# Patient Record
Sex: FEMALE | Race: WHITE | NOT HISPANIC OR LATINO | Employment: FULL TIME | URBAN - METROPOLITAN AREA
[De-identification: names, ages, dates, MRNs, and addresses within clinical notes are randomized per-mention and may not be internally consistent; named-entity substitution may affect disease eponyms.]

---

## 2020-11-06 ENCOUNTER — HOSPITAL ENCOUNTER (EMERGENCY)
Facility: MEDICAL CENTER | Age: 25
End: 2020-11-06
Attending: EMERGENCY MEDICINE
Payer: COMMERCIAL

## 2020-11-06 ENCOUNTER — APPOINTMENT (OUTPATIENT)
Dept: RADIOLOGY | Facility: MEDICAL CENTER | Age: 25
End: 2020-11-06
Attending: EMERGENCY MEDICINE
Payer: COMMERCIAL

## 2020-11-06 VITALS
SYSTOLIC BLOOD PRESSURE: 120 MMHG | HEART RATE: 63 BPM | DIASTOLIC BLOOD PRESSURE: 67 MMHG | BODY MASS INDEX: 23 KG/M2 | TEMPERATURE: 99.3 F | HEIGHT: 62 IN | OXYGEN SATURATION: 97 % | WEIGHT: 125 LBS | RESPIRATION RATE: 16 BRPM

## 2020-11-06 DIAGNOSIS — S89.92XA INJURY OF LEFT KNEE, INITIAL ENCOUNTER: ICD-10-CM

## 2020-11-06 PROCEDURE — 73564 X-RAY EXAM KNEE 4 OR MORE: CPT | Mod: LT

## 2020-11-06 PROCEDURE — 302093 ICE PACK LG: Mod: EDC | Performed by: EMERGENCY MEDICINE

## 2020-11-06 PROCEDURE — 99283 EMERGENCY DEPT VISIT LOW MDM: CPT | Mod: EDC

## 2020-11-06 RX ORDER — NAPROXEN 375 MG/1
375 TABLET ORAL 2 TIMES DAILY WITH MEALS
Qty: 60 TAB | Refills: 0 | Status: SHIPPED | OUTPATIENT
Start: 2020-11-06 | End: 2021-11-30

## 2020-11-06 ASSESSMENT — LIFESTYLE VARIABLES: DO YOU DRINK ALCOHOL: NO

## 2020-11-06 NOTE — LETTER
"  FORM C-4:  EMPLOYEE’S CLAIM FOR COMPENSATION/ REPORT OF INITIAL TREATMENT  EMPLOYEE’S CLAIM - PROVIDE ALL INFORMATION REQUESTED   First Name Tierra Last Name Neville Horn Birthdate 1995  Sex female Claim Number   Home Address [address]             [place] [address]                                   Age  25 y.o. 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 (if applicable) walking down stairs    How did this injury or occupational disease occur? Be specific and answer in detail. Use additional sheet if necessary)  Walking down stairs, rolled ankle, knee gave out.    If you believe that you have an occupational disease, when did you first have knowledge of the disability and it relationship to your employment? na Witnesses to the Accident  Carlos Andrea   Nature of Injury or Occupational Disease  Workers' Compensation Part(s) of Body Injured or Affected  Knee (L), N/A, N/A    I CERTIFY THAT THE ABOVE IS TRUE AND CORRECT TO THE BEST OF MY KNOWLEDGE AND THAT I HAVE PROVIDED THIS INFORMATION IN ORDER TO OBTAIN THE BENEFITS OF [place] " [place]).  I HEREBY AUTHORIZE ANY PHYSICIAN, CHIROPRACTOR, SURGEON, PRACTITIONER, OR OTHER PERSON, ANY HOSPITAL, INCLUDING [facility] OR [facility] H[facility], ANY MEDICAL SERVICE ORGANIZATION, ANY INSURANCE COMPANY, OR OTHER INSTITUTION OR ORGANIZATION TO RELEASE TO EACH OTHER, ANY MEDICAL OR OTHER INFORMATION, INCLUDING BENEFITS PAID OR PAYABLE, PERTINENT TO THIS INJURY OR DISEASE, EXCEPT INFORMATION RELATIVE TO DIAGNOSIS, TREATMENT AND/OR COUNSELING FOR AIDS, PSYCHOLOGICAL CONDITIONS, ALCOHOL OR CONTROLLED SUBSTANCES, FOR WHICH I MUST GIVE SPECIFIC AUTHORIZATION.  A PHOTOSTAT OF THIS AUTHORIZATION SHALL BE AS VALID AS THE ORIGINAL.  Date   11/06/2020      Place    [facility]               Employee’s Signature   THIS REPORT MUST BE COMPLETED AND MAILED WITHIN 3 WORKING DAYS OF TREATMENT   Place [facility], EMERGENCY DEPT                       Name of Facility [facility]   Date  11/6/2020 Diagnosis  (S89.92XA) Injury of left knee, initial encounter Is there evidence the injured employee was under the influence of alcohol and/or another controlled substance at the time of accident?   Hour  2:14 PM Description of Injury or Disease  Injury of left knee, initial encounter No   Treatment  Evaluation of the injury with x-rays.  The patient will be treated with ice and anti-inflammatories.  The patient is to follow-up with orthopedics/occupational health for further outpatient treatment and care.  Have you advised the patient to remain off work five days or more?         No   X-Ray Findings  Negative If Yes   From Date    To Date      From information given by the employee, together with medical evidence, can you directly connect this injury or occupational disease as job incurred? Yes If No, is employee capable of: Full Duty  No Modified Duty  Yes   Is additional medical care by a physician indicated? Yes If Modified Duty, Specify any  "Limitations / Restrictions   Knee immobilizer and crutches until cleared by occupational health or orthopedic surgery.   Do you know of any previous injury or disease contributing to this condition or occupational disease? No    Date 11/6/2020 Print Doctor’s Name Erwin Garcia certify the employer’s copy of this form was mailed on:   Address [address] [place] [address]  [place] [id number] [phone] INSURER’S USE ONLY   Provider’s Tax ID Number [id number] Telephone Dept: [phone]    Doctor’s Signature e-ERWIN Marcano M.D. Degree  M.D.      Form C-4 (rev.10/07)                                                                         BRIEF DESCRIPTION OF RIGHTS AND BENEFITS  (Pursuant to [place] 616C.050)    Notice of Injury or Occupational Disease (Incident Report Form C-1): If an injury or occupational disease (OD) arises out of and in the course of employment, you must provide written notice to your employer as soon as practicable, but no later than 7 days after the accident or OD. Your employer shall maintain a sufficient supply of the required forms.    Claim for Compensation (Form C-4): If medical treatment is sought, the form C-4 is available at the place of initial treatment. A completed \"Claim for Compensation\" (Form C-4) must be filed within 90 days after an accident or OD. The treating physician or chiropractor must, within 3 working days after treatment, complete and mail to the employer, the employer's insurer and third-party , the Claim for Compensation.    Medical Treatment: If you require medical treatment for your on-the-job injury or OD, you may be required to select a physician or chiropractor from a list provided by your workers’ compensation insurer, if it has contracted with an Organization for Managed Care (MCO) or Preferred Provider Organization (PPO) or providers of health care. If your employer has not entered into a contract with an MCO or PPO, you may select a physician or " chiropractor from the Panel of Physicians and Chiropractors. Any medical costs related to your industrial injury or OD will be paid by your insurer.    Temporary Total Disability (TTD): If your doctor has certified that you are unable to work for a period of at least 5 consecutive days, or 5 cumulative days in a 20-day period, or places restrictions on you that your employer does not accommodate, you may be entitled to TTD compensation.    Temporary Partial Disability (TPD): If the wage you receive upon reemployment is less than the compensation for TTD to which you are entitled, the insurer may be required to pay you TPD compensation to make up the difference. TPD can only be paid for a maximum of 24 months.    Permanent Partial Disability (PPD): When your medical condition is stable and there is an indication of a PPD as a result of your injury or OD, within 30 days, your insurer must arrange for an evaluation by a rating physician or chiropractor to determine the degree of your PPD. The amount of your PPD award depends on the date of injury, the results of the PPD evaluation and your age and wage.    Permanent Total Disability (PTD): If you are medically certified by a treating physician or chiropractor as permanently and totally disabled and have been granted a PTD status by your insurer, you are entitled to receive monthly benefits not to exceed 66 2/3% of your average monthly wage. The amount of your PTD payments is subject to reduction if you previously received a PPD award.    Vocational Rehabilitation Services: You may be eligible for vocational rehabilitation services if you are unable to return to the job due to a permanent physical impairment or permanent restrictions as a result of your injury or occupational disease.    Transportation and Per Jose Reimbursement: You may be eligible for travel expenses and per jose associated with medical treatment.    Reopening: You may be able to reopen your claim if  your condition worsens after claim closure.     Appeal Process: If you disagree with a written determination issued by the insurer or the insurer does not respond to your request, you may appeal to the Department of Administration, , by following the instructions contained in your determination letter. You must appeal the determination within 70 days from the date of the determination letter at [address], [address], [place], [place] [address], or [address], [place], [place] [address]. If you disagree with the  decision, you may appeal to the Department of Administration, . You must file your appeal within 30 days from the date of the  decision letter at [address], [address], [place], [place] [address], or [address], [facility] [address], [place], [place] [address]. If you disagree with a decision of an , you may file a petition for judicial review with the District Court. You must do so within 30 days of the Appeal Officer’s decision. You may be represented by an  at your own expense or you may contact the [facility] for possible representation.    [place]  for Injured Workers (NAIW): If you disagree with a  decision, you may request that NAIW represent you without charge at an  Hearing. For information regarding denial of benefits, you may contact the [facility] at: [address], [address], [place] [address], [phone], or [address], [facility] [address] [facility] [address], [place] [address], [phone]    To File a Complaint with the Division: If you wish to file a complaint with the  of the Division of Industrial Relations (DIR),  please contact the Workers’ Compensation Section, [address], [address], [place], [place] [address], telephone [phone], or [address], [place], [place] [address], telephone [phone]  [phone].    For assistance with Workers’ Compensation Issues: You may contact the Office of the Governor Consumer Health Assistance, [address] [place] [address], [address], [place] [address], Toll Free 1-531.360.8902, Web site: http://govTrinity Health System [email or web address]., E-mail [email or web address].  D-2 (rev. 06/18)              __________________________________________________________________                                    _________________            Employee Name / Signature                                                                                                                            Date

## 2020-11-06 NOTE — ED PROVIDER NOTES
"ED Provider Note    Scribed for Erwin Garcia M.D. by Carlos Browning. 11/6/2020, 11:39 AM.    Primary care provider: No primary care provider on file.  Means of arrival: Walk in  History obtained from: Patient  History limited by: None    CHIEF COMPLAINT  Chief Complaint   Patient presents with   • T-5000 Extremity Pain     lt knee       HPI  Tierra Horn is a 25 y.o. female who presents to the Emergency Department for left knee pain onset prior to arrival. Patient states that she was walking down the steps at work when her left knee gave out and she fell down. The pain is described as burning behind her knee. She landed on her sided and denies any pain at this time. Patient notes that she hurt her left knee previously, but did no follow up with orthopedics.The pain is exacerbated when she attempted to straighten her leg. No alleviating factors were stated.     REVIEW OF SYSTEMS  As above otherwise all other systems are negative    PAST MEDICAL HISTORY  None stated.    SURGICAL HISTORY  patient denies any surgical history    SOCIAL HISTORY  Social History     Tobacco Use   • Smoking status: Never Smoker   • Smokeless tobacco: Never Used   Substance Use Topics   • Alcohol use: Not Currently   • Drug use: Not Currently      Social History     Substance and Sexual Activity   Drug Use Not Currently       FAMILY HISTORY  History reviewed. No pertinent family history.    CURRENT MEDICATIONS  No current facility-administered medications for this encounter.     Current Outpatient Medications:   •  naproxen, 375 mg, Oral, BID WITH MEALS     ALLERGIES  No Known Allergies    PHYSICAL EXAM  VITAL SIGNS: /103   Pulse (!) 57   Temp 36.4 °C (97.6 °F) (Temporal)   Resp 18   Ht 1.575 m (5' 2\")   Wt 56.7 kg (125 lb)   SpO2 99%   BMI 22.86 kg/m²     Constitutional: Well developed, Well nourished, No acute distress, Non-toxic appearance.   HENT: Normocephalic, Atraumatic,   Neck: non tender  Cardiovascular: " Regular rate and rhythm without murmurs gallops or rubs.   Thorax & Lungs: No respiratory distress. Breathing comfortably. Lungs are clear to auscultation bilaterally, there are no wheezes no rales. Chest wall is nontender.  Abdomen: Soft, nontender, nondistended. Bowel sounds are present.   Back: No tenderness,.  Musculoskeletal: Limited extension to approximately 10° and limit flection to approximately 100°. ACL, MCL, LCL, and PCL with no obvious deformity. No effusion. Tenderness to medial joint space. No major deformities noted. Intact distal pulses, no clubbing, no cyanosis, no edema,   Neurologic: Alert & oriented x 3, Moving all extremities. No gross abnormalities.    Psychiatric: Affect normal, Judgment normal, Mood normal.     RADIOLOGY  DX-KNEE COMPLETE 4+ LEFT   Final Result      No acute osseous abnormality.        The radiologist's interpretation of all radiological studies have been reviewed by me.    COURSE & MEDICAL DECISION MAKING  Pertinent Labs & Imaging studies reviewed. (See chart for details)    11:39 AM - Patient seen and examined at bedside. Ordered dx-knee to evaluate her symptoms.  1:59 PM Patient was reevaluated at bedside. Discussed radiology results with the patient as detailed above. Patient states that she is feeling better and was able to stand on her knee. Patient will be discharge at this time and will follow up with orthopedics.    Decision Making:  Patient presents emerge department for evaluation for clinically patient's ACL, PCL, LCL and MCL are grossly intact the patient just has discomfort with asked extension of the knee itself.  3 possible this could be a meniscal injury versus subchondral issue.  At this point we will place the patient into a knee immobilizer splint crutches recommend for the patient to follow-up with orthopedic surgery for further outpatient treatment and care however this did happen on the job so I recommended that she follow-up with occupational health  for this referral.     The patient will return for new or worsening symptoms and is stable at the time of discharge.    The patient is referred to a primary physician for blood pressure management, diabetic screening, and for all other preventative health concerns.    DISPOSITION:  Patient will be discharged home in stable condition.    FOLLOW UP:  [facility] [employer] [place] [address] [place] [address] [phone]  Schedule an appointment as soon as possible for a visit   For re-check, Return if any symptoms worsen      OUTPATIENT MEDICATIONS:  Discharge Medication List as of 11/6/2020  2:30 PM      START taking these medications    Details   naproxen (NAPROSYN) 375 MG Tab Take 1 Tab by mouth 2 times a day, with meals., Disp-60 Tab, R-0, Print Rx Paper               FINAL IMPRESSION  1. Injury of left knee, initial encounter          Carlos WRIGHT (Scribe), am scribing for, and in the presence of, Erwin Garcia M.D..    Electronically signed by: Carlos Browning ([place]), 11/6/2020    IErwin M.D. personally performed the services described in this documentation, as scribed by Carlos Browning in my presence, and it is both accurate and complete.  E  The note accurately reflects work and decisions made by me.  Erwin Garcia M.D.  11/6/2020  3:11 PM

## 2020-11-06 NOTE — ED NOTES
Tierra Horn has been discharged from the Emergency Room.    Discharge instructions, which include signs and symptoms to monitor patient for, hydration and hand hygiene importance, as well as detailed information regarding knee sprain provided.  This RN also encouraged a follow- up appointment to be made with patient's PCP.  All questions and concerns addressed at this time.        Prescription for naproxen with patient. Patient instructed on importance of completing full course of medication, verbalized understanding.    Patient leaves ER in no apparent distress, is awake, alert, pink. Pt is aware of the need to return to the ER for any concerns or changes in current condition.

## 2020-11-06 NOTE — ED NOTES
Agree with triage note.  Pt reporting left medal knee pain.  Per pt she injured her knee twice and was told she had a MCL injury.  Today, pt knee gave out and increase in pain with leg extension.  Negative posterior and anterior drawer test.  Pt denies any numbness or tingling.  Pt states increase in tenderness with palpation.  Pt denies taking any medication to help with the pain.  Chart up for ERP.

## 2020-11-06 NOTE — ED TRIAGE NOTES
"Pt to triage, c/o lt knee pain, states her knee gave out on her while working, went to take a step and it was off causing her knee to give out\" .   "

## 2020-11-06 NOTE — LETTER
"  FORM C-4:  EMPLOYEE’S CLAIM FOR COMPENSATION/ REPORT OF INITIAL TREATMENT  EMPLOYEE’S CLAIM - PROVIDE ALL INFORMATION REQUESTED   First Name Tierra Last Name Neville Horn Birthdate 1995  Sex female Claim Number   Home Address [address]             Zip [address]                                   Age  25 y.o. 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 (if applicable) walking down stairs    How did this injury or occupational disease occur? Be specific and answer in detail. Use additional sheet if necessary)  Walking down stairs, rolled ankle, knee gave out.    If you believe that you have an occupational disease, when did you first have knowledge of the disability and it relationship to your employment? na Witnesses to the Accident  Carlos Andrea   Nature of Injury or Occupational Disease  Workers' Compensation Part(s) of Body Injured or Affected  Knee (L), N/A, N/A    I CERTIFY THAT THE ABOVE IS TRUE AND CORRECT TO THE BEST OF MY KNOWLEDGE AND THAT I HAVE PROVIDED THIS INFORMATION IN ORDER TO OBTAIN THE BENEFITS OF [place] " [place]).  I HEREBY AUTHORIZE ANY PHYSICIAN, CHIROPRACTOR, SURGEON, PRACTITIONER, OR OTHER PERSON, ANY HOSPITAL, INCLUDING [facility] OR [facility] H[facility]PITAL, ANY MEDICAL SERVICE ORGANIZATION, ANY INSURANCE COMPANY, OR OTHER INSTITUTION OR ORGANIZATION TO RELEASE TO EACH OTHER, ANY MEDICAL OR OTHER INFORMATION, INCLUDING BENEFITS PAID OR PAYABLE, PERTINENT TO THIS INJURY OR DISEASE, EXCEPT INFORMATION RELATIVE TO DIAGNOSIS, TREATMENT AND/OR COUNSELING FOR AIDS, PSYCHOLOGICAL CONDITIONS, ALCOHOL OR CONTROLLED SUBSTANCES, FOR WHICH I MUST GIVE SPECIFIC AUTHORIZATION.  A PHOTOSTAT OF THIS AUTHORIZATION SHALL BE AS VALID AS THE ORIGINAL.  Date                                      Place                                                                             Employee’s Signature   THIS REPORT MUST BE COMPLETED AND MAILED WITHIN 3 WORKING DAYS OF TREATMENT   Place [facility], EMERGENCY DEPT                       Name of Facility [facility]   Date  11/6/2020 Diagnosis  (S89.92XA) Injury of left knee, initial encounter Is there evidence the injured employee was under the influence of alcohol and/or another controlled substance at the time of accident?   Hour  2:28 PM Description of Injury or Disease  Injury of left knee, initial encounter No   Treatment  Evaluation of the injury with x-rays.  The patient will be treated with ice and anti-inflammatories.  The patient is to follow-up with orthopedics/occupational health for further outpatient treatment and care.  Have you advised the patient to remain off work five days or more?         No   X-Ray Findings  Negative If Yes   From Date    To Date      From information given by the employee, together with medical evidence, can you directly connect this injury or occupational disease as job incurred? Yes If No, is employee capable of: Full Duty  No Modified Duty  Yes   Is additional medical care by a physician  "indicated? Yes If Modified Duty, Specify any Limitations / Restrictions   Knee immobilizer and crutches until cleared by occupational health or orthopedic surgery.   Do you know of any previous injury or disease contributing to this condition or occupational disease? No    Date 11/6/2020 Print Doctor’s Name Erwin Garcia certify the employer’s copy of this form was mailed on:   Address [address] [place] [id number] [phone] INSURER’S USE ONLY   Provider’s Tax ID Number [id number] Telephone Dept: [phone]    Doctor’s Signature e-ERWIN Marcano M.D. Degree  M.D.      Form C-4 (rev.10/07)                                                                         BRIEF DESCRIPTION OF RIGHTS AND BENEFITS  (Pursuant to [place] 616C.050)    Notice of Injury or Occupational Disease (Incident Report Form C-1): If an injury or occupational disease (OD) arises out of and in the course of employment, you must provide written notice to your employer as soon as practicable, but no later than 7 days after the accident or OD. Your employer shall maintain a sufficient supply of the required forms.    Claim for Compensation (Form C-4): If medical treatment is sought, the form C-4 is available at the place of initial treatment. A completed \"Claim for Compensation\" (Form C-4) must be filed within 90 days after an accident or OD. The treating physician or chiropractor must, within 3 working days after treatment, complete and mail to the employer, the employer's insurer and third-party , the Claim for Compensation.    Medical Treatment: If you require medical treatment for your on-the-job injury or OD, you may be required to select a physician or chiropractor from a list provided by your workers’ compensation insurer, if it has contracted with an Organization for Managed Care (MCO) or Preferred Provider Organization (PPO) or providers of health care. If your employer has not entered into a contract with " an MCO or PPO, you may select a physician or chiropractor from the Panel of Physicians and Chiropractors. Any medical costs related to your industrial injury or OD will be paid by your insurer.    Temporary Total Disability (TTD): If your doctor has certified that you are unable to work for a period of at least 5 consecutive days, or 5 cumulative days in a 20-day period, or places restrictions on you that your employer does not accommodate, you may be entitled to TTD compensation.    Temporary Partial Disability (TPD): If the wage you receive upon reemployment is less than the compensation for TTD to which you are entitled, the insurer may be required to pay you TPD compensation to make up the difference. TPD can only be paid for a maximum of 24 months.    Permanent Partial Disability (PPD): When your medical condition is stable and there is an indication of a PPD as a result of your injury or OD, within 30 days, your insurer must arrange for an evaluation by a rating physician or chiropractor to determine the degree of your PPD. The amount of your PPD award depends on the date of injury, the results of the PPD evaluation and your age and wage.    Permanent Total Disability (PTD): If you are medically certified by a treating physician or chiropractor as permanently and totally disabled and have been granted a PTD status by your insurer, you are entitled to receive monthly benefits not to exceed 66 2/3% of your average monthly wage. The amount of your PTD payments is subject to reduction if you previously received a PPD award.    Vocational Rehabilitation Services: You may be eligible for vocational rehabilitation services if you are unable to return to the job due to a permanent physical impairment or permanent restrictions as a result of your injury or occupational disease.    Transportation and Per Jose Reimbursement: You may be eligible for travel expenses and per jose associated with medical  treatment.    Reopening: You may be able to reopen your claim if your condition worsens after claim closure.     Appeal Process: If you disagree with a written determination issued by the insurer or the insurer does not respond to your request, you may appeal to the Department of Administration, , by following the instructions contained in your determination letter. You must appeal the determination within 70 days from the date of the determination letter at [address], [address], [place], [place] [address], or [address], [place], [place] [address]. If you disagree with the  decision, you may appeal to the Department of Administration, . You must file your appeal within 30 days from the date of the  decision letter at [address], [address], [place], [place] [address], or [address], [place], [place] [address]. If you disagree with a decision of an , you may file a petition for judicial review with the District Court. You must do so within 30 days of the Appeal Officer’s decision. You may be represented by an  at your own expense or you may contact the [facility] for possible representation.    [place]  for Injured Workers (NAIW): If you disagree with a  decision, you may request that NAIW represent you without charge at an  Hearing. For information regarding denial of benefits, you may contact the [facility] at: [address], [address], [place] [address], [phone], or [address], [place] [address], [phone]    To File a Complaint with the Division: If you wish to file a complaint with the  of the Division of Industrial Relations (DIR),  please contact the Workers’ Compensation Section, [address], [address], [place], [place] [address], telephone [phone]) [phone], or [address], [place], [place] [address], telephone [phone].    For assistance with Workers’ Compensation Issues: You may contact the Office of the Governor Consumer Health Assistance, [facility] [address], [place], [place] [address], Toll Free 1-138.929.1087, Web site: [email or web address]., E-mail [email or web address].  D-2 (rev. 06/18)              __________________________________________________________________                                    _________________            Employee Name / Signature                                                                                                                            Date

## 2021-11-30 ENCOUNTER — OFFICE VISIT (OUTPATIENT)
Dept: MEDICAL GROUP | Facility: MEDICAL CENTER | Age: 26
End: 2021-11-30
Payer: COMMERCIAL

## 2021-11-30 VITALS
BODY MASS INDEX: 25.27 KG/M2 | DIASTOLIC BLOOD PRESSURE: 62 MMHG | OXYGEN SATURATION: 98 % | WEIGHT: 137.35 LBS | TEMPERATURE: 98.1 F | HEIGHT: 62 IN | HEART RATE: 76 BPM | SYSTOLIC BLOOD PRESSURE: 108 MMHG | RESPIRATION RATE: 16 BRPM

## 2021-11-30 DIAGNOSIS — Z23 NEED FOR VACCINATION: ICD-10-CM

## 2021-11-30 DIAGNOSIS — F60.3 BORDERLINE PERSONALITY DISORDER (HCC): ICD-10-CM

## 2021-11-30 DIAGNOSIS — Z30.09 BIRTH CONTROL COUNSELING: ICD-10-CM

## 2021-11-30 DIAGNOSIS — F41.1 GAD (GENERALIZED ANXIETY DISORDER): ICD-10-CM

## 2021-11-30 DIAGNOSIS — F33.1 MODERATE EPISODE OF RECURRENT MAJOR DEPRESSIVE DISORDER (HCC): ICD-10-CM

## 2021-11-30 DIAGNOSIS — Z97.5 IUD (INTRAUTERINE DEVICE) IN PLACE: ICD-10-CM

## 2021-11-30 DIAGNOSIS — G47.9 SLEEP DISORDER: ICD-10-CM

## 2021-11-30 DIAGNOSIS — Z00.00 PREVENTATIVE HEALTH CARE: ICD-10-CM

## 2021-11-30 PROBLEM — F41.0 PANIC ATTACK: Status: ACTIVE | Noted: 2020-08-19

## 2021-11-30 PROBLEM — F43.22 ADJUSTMENT DISORDER WITH ANXIOUS MOOD: Status: ACTIVE | Noted: 2020-08-19

## 2021-11-30 PROBLEM — F32.1 CURRENT MODERATE EPISODE OF MAJOR DEPRESSIVE DISORDER (HCC): Status: ACTIVE | Noted: 2021-11-30

## 2021-11-30 PROBLEM — A56.2 CHLAMYDIA TRACHOMATIS INFECTION OF GENITOURINARY SITE: Status: ACTIVE | Noted: 2019-07-05

## 2021-11-30 PROCEDURE — 99204 OFFICE O/P NEW MOD 45 MIN: CPT | Mod: 25 | Performed by: PHYSICIAN ASSISTANT

## 2021-11-30 PROCEDURE — 90471 IMMUNIZATION ADMIN: CPT | Performed by: PHYSICIAN ASSISTANT

## 2021-11-30 PROCEDURE — 90651 9VHPV VACCINE 2/3 DOSE IM: CPT | Performed by: PHYSICIAN ASSISTANT

## 2021-11-30 RX ORDER — COPPER 313.4 MG/1
INTRAUTERINE DEVICE INTRAUTERINE
COMMUNITY
Start: 2016-12-14 | End: 2022-07-14

## 2021-11-30 RX ORDER — NORGESTREL AND ETHINYL ESTRADIOL 0.3-0.03MG
1 KIT ORAL DAILY
Qty: 84 TABLET | Refills: 3 | Status: SHIPPED | OUTPATIENT
Start: 2021-11-30 | End: 2023-03-23 | Stop reason: SINTOL

## 2021-11-30 RX ORDER — HYDROXYZINE HYDROCHLORIDE 25 MG/1
12.5-25 TABLET, FILM COATED ORAL 3 TIMES DAILY PRN
Qty: 30 TABLET | Refills: 1 | Status: SHIPPED | OUTPATIENT
Start: 2021-11-30 | End: 2023-03-23

## 2021-11-30 ASSESSMENT — ANXIETY QUESTIONNAIRES
4. TROUBLE RELAXING: NEARLY EVERY DAY
6. BECOMING EASILY ANNOYED OR IRRITABLE: NEARLY EVERY DAY
IF YOU CHECKED OFF ANY PROBLEMS ON THIS QUESTIONNAIRE, HOW DIFFICULT HAVE THESE PROBLEMS MADE IT FOR YOU TO DO YOUR WORK, TAKE CARE OF THINGS AT HOME, OR GET ALONG WITH OTHER PEOPLE: SOMEWHAT DIFFICULT
2. NOT BEING ABLE TO STOP OR CONTROL WORRYING: NEARLY EVERY DAY
GAD7 TOTAL SCORE: 21
5. BEING SO RESTLESS THAT IT IS HARD TO SIT STILL: NEARLY EVERY DAY
3. WORRYING TOO MUCH ABOUT DIFFERENT THINGS: NEARLY EVERY DAY
1. FEELING NERVOUS, ANXIOUS, OR ON EDGE: NEARLY EVERY DAY
7. FEELING AFRAID AS IF SOMETHING AWFUL MIGHT HAPPEN: NEARLY EVERY DAY

## 2021-11-30 ASSESSMENT — PATIENT HEALTH QUESTIONNAIRE - PHQ9
SUM OF ALL RESPONSES TO PHQ QUESTIONS 1-9: 18
8. MOVING OR SPEAKING SO SLOWLY THAT OTHER PEOPLE COULD HAVE NOTICED. OR THE OPPOSITE, BEING SO FIGETY OR RESTLESS THAT YOU HAVE BEEN MOVING AROUND A LOT MORE THAN USUAL: NEARLY EVERY DAY
4. FEELING TIRED OR HAVING LITTLE ENERGY: MORE THAN HALF THE DAYS
5. POOR APPETITE OR OVEREATING: SEVERAL DAYS
3. TROUBLE FALLING OR STAYING ASLEEP OR SLEEPING TOO MUCH: SEVERAL DAYS
7. TROUBLE CONCENTRATING ON THINGS, SUCH AS READING THE NEWSPAPER OR WATCHING TELEVISION: MORE THAN HALF THE DAYS
SUM OF ALL RESPONSES TO PHQ9 QUESTIONS 1 AND 2: 6
1. LITTLE INTEREST OR PLEASURE IN DOING THINGS: NEARLY EVERY DAY
9. THOUGHTS THAT YOU WOULD BE BETTER OFF DEAD, OR OF HURTING YOURSELF: NOT AT ALL
6. FEELING BAD ABOUT YOURSELF - OR THAT YOU ARE A FAILURE OR HAVE LET YOURSELF OR YOUR FAMILY DOWN: NEARLY EVERY DAY
2. FEELING DOWN, DEPRESSED, IRRITABLE, OR HOPELESS: NEARLY EVERY DAY

## 2021-11-30 NOTE — PROGRESS NOTES
"Chief Complaint   Patient presents with   • Establish Care   • Contraception     discuss options    • Referral Needed     therapy       HPI  Tierra Horn is a 26 y.o. female here today for establishing care.  Moved here from [place]    HPI:  Patient is G1, P1, has a 7-year-old daughter who lives with her ex. patient has history of depression and anxiety, has tried Prozac and Celexa in the past however has a stopped because she had some SE, states she was told she probably has borderline personality disorder however due to loss of insurance she was never able to follow-up.  Currently has daily anxiety, difficulty with sleep,  Patient has been using daily marijuana to help self medicate for sleep and anxiety.     Patient has copper IUD, was put in about 4-1/2 years ago, was doing okay at the beginning, started having pain with menses and currently has pain daily with cramps.  States pain can interfere with her job.  Takes OTC NSAIDs for pain.  Periods are regular with normal flow.    Exam:  /62 (BP Location: Left arm, Patient Position: Sitting)   Pulse 76   Temp 36.7 °C (98.1 °F) (Temporal)   Resp 16   Ht 1.575 m (5' 2\")   Wt 62.3 kg (137 lb 5.6 oz)   SpO2 98%       Constitutional: Alert, oriented in no acute distress.  Psych: Eye contact is good, speech goal directed, affect calm  Eyes: Conjunctiva non-injected, sclera non-icteric.  Lungs: Unlabored respiratory effort, clear to auscultation bilaterally with good excursion, no wheez or rhonci  CV: regular rate and rhythm. No lower extremity edema  Ears:  External ears unremarkable. TMs pearly gray, clear and intact, w/o any perforation or effusion.        A/P:    1. Need for vaccination    - 9VHPV Vaccine 2-3 Dose (GARDASIL 9)    2. Moderate episode of recurrent major depressive disorder (HCC)    - Referral to Behavioral Health    3. EVA (generalized anxiety disorder)    - Referral to Behavioral Health    4. Preventative health care    - CBC WITH " DIFFERENTIAL; Future  - Comp Metabolic Panel; Future  - Lipid Profile; Future  - TSH WITH REFLEX TO FT4; Future  - VITAMIN D,25 HYDROXY; Future    5. IUD (intrauterine device) in place    6. Birth control counseling    - norgestrel-ethinyl estradiol (LOW-OGESTREL) 0.3-30 MG-MCG Tab; Take 1 Tablet by mouth every day.  Dispense: 84 Tablet; Refill: 3    7. Borderline personality disorder (HCC)  - Referral to Behavioral Health    8. Sleep disorder    - hydrOXYzine HCl (ATARAX) 25 MG Tab; Take 0.5-1 Tablets by mouth 3 times a day as needed for Anxiety.  Dispense: 30 Tablet; Refill: 1        F/U: 4 wks for pap, IUD removal, lab f/u

## 2021-11-30 NOTE — LETTER
[facility]  [id number]     November 30, 2021    Patient: Tierra Horn   YOB: 1995   Date of Visit: 11/30/2021       To Whom It May Concern:    Tierra Horn was seen and treated in our department on 11/30/2021.     Sincerely,         Betsy Mahoney P.A.-C.

## 2021-11-30 NOTE — LETTER
November 30, 2021         Patient: Tierra Horn   YOB: 1995   Date of Visit: 11/30/2021           To Whom it May Concern:    Tierra Horn was seen in my clinic on 11/30/2021. Please excuse her absence today.    If you have any questions or concerns, please don't hesitate to call.        Sincerely,           Betsy Mahoney P.A.-C.  Electronically Signed

## 2021-12-20 ENCOUNTER — TELEPHONE (OUTPATIENT)
Dept: MEDICAL GROUP | Facility: MEDICAL CENTER | Age: 26
End: 2021-12-20

## 2021-12-20 NOTE — TELEPHONE ENCOUNTER
"This patient had an appoint scheduled on 12/15/2021 which she had arrived about 17 minutes late. I explained to the patient that at this point we will need to reschedule her visit since its passed the 10min late policy and due to the reason there is not enough to do her visit. At this moment  Patient got very upset and asked to be seen either way, which I did approach Betsy and even she said she cant do what she is asking but will still talk to her if she has concerns but will not do her pap or remove the IUD.  I explained to the patient her options which she was not happy about. She insisted to be seen because she has taken time of and mentioned how PCP only cares about going to lunch and not her patients. She inquired on changing PCP and I Gave her options on other locations which will be closer to her if needed. Patient was still very rude and combative about the policy and how we \"do not care\" at this point the only options were reschedule to another day and time or she can seek a new provider at a location closest to her. Wee rescheduled and she left cursing and mumbling under her breath which there were other patients in the lobby witnessing what was happening.   "

## 2022-02-10 ENCOUNTER — TELEMEDICINE (OUTPATIENT)
Dept: BEHAVIORAL HEALTH | Facility: CLINIC | Age: 27
End: 2022-02-10
Payer: COMMERCIAL

## 2022-02-10 DIAGNOSIS — F43.23 ADJUSTMENT DISORDER WITH MIXED ANXIETY AND DEPRESSED MOOD: ICD-10-CM

## 2022-02-10 PROCEDURE — 90791 PSYCH DIAGNOSTIC EVALUATION: CPT | Performed by: PSYCHOLOGIST

## 2022-02-10 NOTE — PROGRESS NOTES
BEHAVIORAL HEALTH  INITIAL PSYCHOLOGICAL EVALUATION    Name: Tierra Horn   MRN: [id number]   : 1995   Age: 26 y.o.   Date of assessment: 2/10/2022   PCP: Betsy Mahoney P.A.-C.   Persons in attendance:Patient  Total face-to-face time: 57    This evaluation was conducted via Zoom using secure and encrypted videoconferencing technology. The patient was at home (POS 10) in the [facility].  The patient's identity was confirmed and verbal consent was obtained for this virtual visit.     This provider informed the patient that their medical records are completely confidential except for the use by other providers involved in their care, or if the patient signs a Release of Information, or to report instances of child or elder abuse, or if it is determined they are an immediate risk of harm to themselves or others.    CHIEF COMPLAINT AND HISTORY OF PRESENTING PROBLEM:   (As stated by Patient)  Tierra  is a 26 y.o., female referred for assessment by self. Primary presenting issue includes No chief complaint on file.    The year has been hectic, and now she has insurance. The previous therapist mentioned both depression and borderline PD. She also has general health concerns and relationship issues.     She lives with her boyfriend and would like to better her communication skills. They have been together for about 4 years, having gone to high school together. She was with her daughter's dad for 4 years, and she is 7 years old.     She does not have a car as it broke down a year and a half ago.     Her boyfriend had a construction job and she worked for [employer] allowing her to have 4 consecutive days off. Eventually she left [employer], and her boyfriend also quit. They lived with his mom to save, and moved out to [place] again with his dad around the time that COVID started. 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     There are financial stressors.    She  "hopes to change her primary care physician, and is having trouble with her IUD causing pain. They lost a day of pay, and did not get the problem resolved. She started taking birth control, but is now pregnant. She got COVID in January and was in the hospital, where they found an ovarian cyst. She planned to terminate the pregnancy but felt guilty and cancelled. They continue to have stress. Her boyfriend has been \"bossy about my pregnancy.\" Her promise to herself was to have the same last name as her baby. She also told him that to stay together she would want to be together out of love and not obligation. He told her last night that he does not want her to have his last name. She has a fear of him leaving her. He sometimes says he does not want the baby.     Her boyfriend feels too young to be a father, and she thinks he may not be emotionally mature enough. He says things insensitively: \"I don't want to be a dad, I never wanted to be.\" She feels like anytime she wants to talk, he experiences her as bossy and nagging.    She is confused. He says he does not want to be a dad, but then will say he wants the baby to be a certain way and have his last name.    She has become less confident, and admits that she can be jealous and possessive. He had a flirting habit, but has since given up. He also looked at porn and masturbated, causing her to be more insecure.    She appreciates that there is someone here to listen.     She was guided that if she would like to do things differently with her boyfriend she could validate and acknowledge garrick tthis is stressful and different, and that she gets why sometimes he is excited and other times dreadful, and is hopeful that they can talk about these things openly together. If she cannot handle hearing this without getting triggered, then she can advise him to talk to someone else to process his feelings. As, if they do not process their reactions, their negative feelings could " pass on to the baby.    HISTORY OF PRESENT ILLNESS:      Patient was seen by a therapist in [place].    FAMILY/SOCIAL HISTORY:    No family history on file.    Social History     Socioeconomic History   • Marital status: Single     Spouse name: Not on file   • Number of children: Not on file   • Years of education: Not on file   • Highest education level: Not on file   Occupational History   • Occupation: cultivation [employer]   Tobacco Use   • Smoking status: Never Smoker   • Smokeless tobacco: Never Used   Vaping Use   • Vaping Use: Never used   Substance and Sexual Activity   • Alcohol use: Not Currently   • Drug use: Yes     Types: Marijuana, Oral, Inhaled     Comment: gummy,dab,daily    • Sexual activity: Yes     Partners: Male     Birth control/protection: I.U.D., Spermicide     Comment: copper IUD 2016   Other Topics Concern   • Not on file   Social History Narrative   • Not on file     Social Determinants of Health     Financial Resource Strain:    • Difficulty of Paying Living Expenses: Not on file   Food Insecurity:    • Worried About Running Out of Food in the Last Year: Not on file   • Ran Out of Food in the Last Year: Not on file   Transportation Needs:    • Lack of Transportation (Medical): Not on file   • Lack of Transportation (Non-Medical): Not on file   Physical Activity:    • Days of Exercise per Week: Not on file   • Minutes of Exercise per Session: Not on file   Stress:    • Feeling of Stress : Not on file   Social Connections:    • Frequency of Communication with Friends and Family: Not on file   • Frequency of Social Gatherings with Friends and Family: Not on file   • Attends Spiritism Services: Not on file   • Active Member of Clubs or Organizations: Not on file   • Attends Club or Organization Meetings: Not on file   • Marital Status: Not on file   Intimate Partner Violence:    • Fear of Current or Ex-Partner: Not on file   • Emotionally Abused: Not on file   • Physically Abused: Not on file   •  Sexually Abused: Not on file   Housing Stability:    • Unable to Pay for Housing in the Last Year: Not on file   • Number of Places Lived in the Last Year: Not on file   • Unstable Housing in the Last Year: Not on file      Social Determinants of Health     Tobacco Use: Low Risk    • Smoking Tobacco Use: Never Smoker   • Smokeless Tobacco Use: Never Used   Alcohol Use:    • Frequency of Alcohol Consumption: Not on file   • Average Number of Drinks: Not on file   • Frequency of Binge Drinking: Not on file   Financial Resource Strain:    • Difficulty of Paying Living Expenses: Not on file   Food Insecurity:    • Worried About Running Out of Food in the Last Year: Not on file   • Ran Out of Food in the Last Year: Not on file   Transportation Needs:    • Lack of Transportation (Medical): Not on file   • Lack of Transportation (Non-Medical): Not on file   Physical Activity:    • Days of Exercise per Week: Not on file   • Minutes of Exercise per Session: Not on file   Stress:    • Feeling of Stress : Not on file   Social Connections:    • Frequency of Communication with Friends and Family: Not on file   • Frequency of Social Gatherings with Friends and Family: Not on file   • Attends Episcopal Services: Not on file   • Active Member of Clubs or Organizations: Not on file   • Attends Club or Organization Meetings: Not on file   • Marital Status: Not on file   Intimate Partner Violence:    • Fear of Current or Ex-Partner: Not on file   • Emotionally Abused: Not on file   • Physically Abused: Not on file   • Sexually Abused: Not on file   Depression: At risk   • PHQ-2 Score: 6   Housing Stability:    • Unable to Pay for Housing in the Last Year: Not on file   • Number of Places Lived in the Last Year: Not on file   • Unstable Housing in the Last Year: Not on file        Relevant family or social history, structure, or dynamics: See above     PSYCHIATRIC TREATMENT HISTORY:  Does patient/parent report a history of outpatient  psychiatric or other behavioral health treatment for patient?   Yes                Does patient/parent report a history of psychiatric hospitalizations for patient?  No:    PAST MEDICAL/SURGICAL HISTORY:     Past Medical History:   Diagnosis Date   • Anxiety    • Depression         Medication Allergies  No Known Allergies     Medications (non psychiatric)  Current Outpatient Medications on File Prior to Visit   Medication Sig Dispense Refill   • hydrOXYzine HCl (ATARAX) 25 MG Tab Take 0.5-1 Tablets by mouth 3 times a day as needed for Anxiety. 30 Tablet 1   • norgestrel-ethinyl estradiol (LOW-OGESTREL) 0.3-30 MG-MCG Tab Take 1 Tablet by mouth every day. 84 Tablet 3   • Paragard Intrauterine Copper IUD by Intrauterine route.       No current facility-administered medications on file prior to visit.      No current facility-administered medications for this visit.     ABUSE/NEGLECT SCREENING:  Does patient report feeling “unsafe” in his/her home, or afraid of anyone?  no  Does patient report any history of physical, sexual, or emotional abuse?  no  Does parent or significant other report any of the above? no  Is there evidence of neglect by self?  no  Is there evidence of neglect by a caregiver? no  Does the patient/parent report any history of CPS/APS/police involvement related to suspected abuse/neglect or domestic violence? no    Based on the information provided during the current assessment, is a mandated report of suspected abuse/neglect being made?  No    SAFETY ASSESSMENT - SELF  Does patient acknowledge, parent/significant other report, or presenting problem suggest risk of dangerousness to self? No    Crisis Safety Plan completed, documented in chart, and copy given to patient: No     SAFETY ASSESSMENT - OTHERS  Does patient acknowledge, parent/significant other report, or presenting problem suggest risk of dangerousness to others? No    Crisis Safety Plan completed, documented in chart, and copy given to  patient: No  MENTAL STATUS EXAM/OBSERVATIONS  There were no vitals taken for this visit.  Participation:  Active verbal participation, Engaged and Open to feedback  Grooming:  Casual  Orientation: Alert and Fully Oriented  Eye contact: Good  Behavior: Calm   Mood:  Anxious  Affect: Flexible  Thought process: Logical and Goal-directed  Thought content: Within normal limits  Speech: Rate within normal limits and Volume within normal limits  Perception: Within normal limits  Memory:  No gross evidence of memory deficits  Insight:  Adequate  Judgment:  Good    CLINICAL FORMULATION: Tierra presents with a number of life stressors and relationship difficulties causing or exacerbating depressive and anxious symptoms. Given the news of her pregnancy and the impact this is having on a number of other life circumstances, and her relative isolation, she would benefit from talk therapy focusing on supportive and empathic inquiry.    DIAGNOSTIC IMPRESSION(S): Adj. Dis.    IDENTIFIED NEEDS/PLAN:  [If any of these marked, trigger DISPOSITION list]  Mood/anxiety  Actively being addressed by [facility]    Does patient express agreement with the above plan? Yes    Referral appointment(s) scheduled? Yes    More than 50% of face-to-face time was spent in counseling and coordinating care  Discussed: See above     Alexi Baird, Ph.D.  Clinical Psychologist  [place] license [id number]

## 2022-02-22 ENCOUNTER — HOSPITAL ENCOUNTER (OUTPATIENT)
Dept: LAB | Facility: MEDICAL CENTER | Age: 27
End: 2022-02-22
Attending: OBSTETRICS & GYNECOLOGY
Payer: COMMERCIAL

## 2022-02-22 ENCOUNTER — HOSPITAL ENCOUNTER (OUTPATIENT)
Facility: MEDICAL CENTER | Age: 27
End: 2022-02-22
Attending: OBSTETRICS & GYNECOLOGY
Payer: COMMERCIAL

## 2022-02-22 LAB
ABO GROUP BLD: NORMAL
BASOPHILS # BLD AUTO: 0.5 % (ref 0–1.8)
BASOPHILS # BLD: 0.04 K/UL (ref 0–0.12)
BLD GP AB SCN SERPL QL: NORMAL
EOSINOPHIL # BLD AUTO: 0.14 K/UL (ref 0–0.51)
EOSINOPHIL NFR BLD: 1.6 % (ref 0–6.9)
ERYTHROCYTE [DISTWIDTH] IN BLOOD BY AUTOMATED COUNT: 43.8 FL (ref 35.9–50)
HBV SURFACE AG SER QL: NORMAL
HCT VFR BLD AUTO: 38.9 % (ref 37–47)
HCV AB SER QL: NORMAL
HGB BLD-MCNC: 13.1 G/DL (ref 12–16)
HIV 1+2 AB+HIV1 P24 AG SERPL QL IA: NORMAL
IMM GRANULOCYTES # BLD AUTO: 0.06 K/UL (ref 0–0.11)
IMM GRANULOCYTES NFR BLD AUTO: 0.7 % (ref 0–0.9)
LYMPHOCYTES # BLD AUTO: 2.39 K/UL (ref 1–4.8)
LYMPHOCYTES NFR BLD: 27.6 % (ref 22–41)
MCH RBC QN AUTO: 29.9 PG (ref 27–33)
MCHC RBC AUTO-ENTMCNC: 33.7 G/DL (ref 33.6–35)
MCV RBC AUTO: 88.8 FL (ref 81.4–97.8)
MONOCYTES # BLD AUTO: 0.52 K/UL (ref 0–0.85)
MONOCYTES NFR BLD AUTO: 6 % (ref 0–13.4)
NEUTROPHILS # BLD AUTO: 5.5 K/UL (ref 2–7.15)
NEUTROPHILS NFR BLD: 63.6 % (ref 44–72)
NRBC # BLD AUTO: 0 K/UL
NRBC BLD-RTO: 0 /100 WBC
PLATELET # BLD AUTO: 294 K/UL (ref 164–446)
PMV BLD AUTO: 9.8 FL (ref 9–12.9)
RBC # BLD AUTO: 4.38 M/UL (ref 4.2–5.4)
RH BLD: NORMAL
RUBV AB SER QL: 112 IU/ML
TREPONEMA PALLIDUM IGG+IGM AB [PRESENCE] IN SERUM OR PLASMA BY IMMUNOASSAY: NORMAL
WBC # BLD AUTO: 8.7 K/UL (ref 4.8–10.8)

## 2022-02-22 PROCEDURE — 86850 RBC ANTIBODY SCREEN: CPT

## 2022-02-22 PROCEDURE — 88175 CYTOPATH C/V AUTO FLUID REDO: CPT

## 2022-02-22 PROCEDURE — 87389 HIV-1 AG W/HIV-1&-2 AB AG IA: CPT

## 2022-02-22 PROCEDURE — 86901 BLOOD TYPING SEROLOGIC RH(D): CPT

## 2022-02-22 PROCEDURE — 86803 HEPATITIS C AB TEST: CPT

## 2022-02-22 PROCEDURE — 87086 URINE CULTURE/COLONY COUNT: CPT

## 2022-02-22 PROCEDURE — 86900 BLOOD TYPING SEROLOGIC ABO: CPT

## 2022-02-22 PROCEDURE — 36415 COLL VENOUS BLD VENIPUNCTURE: CPT

## 2022-02-22 PROCEDURE — 87591 N.GONORRHOEAE DNA AMP PROB: CPT

## 2022-02-22 PROCEDURE — 87340 HEPATITIS B SURFACE AG IA: CPT

## 2022-02-22 PROCEDURE — 86780 TREPONEMA PALLIDUM: CPT

## 2022-02-22 PROCEDURE — 85025 COMPLETE CBC W/AUTO DIFF WBC: CPT

## 2022-02-22 PROCEDURE — 87491 CHLMYD TRACH DNA AMP PROBE: CPT

## 2022-02-22 PROCEDURE — 86762 RUBELLA ANTIBODY: CPT

## 2022-02-25 LAB
BACTERIA UR CULT: NORMAL
SIGNIFICANT IND 70042: NORMAL
SITE SITE: NORMAL
SOURCE SOURCE: NORMAL

## 2022-02-26 LAB
C TRACH DNA GENITAL QL NAA+PROBE: NEGATIVE
CYTOLOGY REG CYTOL: NORMAL
N GONORRHOEA DNA GENITAL QL NAA+PROBE: NEGATIVE
SPECIMEN SOURCE: NORMAL

## 2022-04-14 ENCOUNTER — TELEPHONE (OUTPATIENT)
Dept: SCHEDULING | Facility: IMAGING CENTER | Age: 27
End: 2022-04-14
Payer: COMMERCIAL

## 2022-07-14 ENCOUNTER — HOSPITAL ENCOUNTER (EMERGENCY)
Facility: MEDICAL CENTER | Age: 27
End: 2022-07-14
Attending: EMERGENCY MEDICINE
Payer: COMMERCIAL

## 2022-07-14 ENCOUNTER — APPOINTMENT (OUTPATIENT)
Dept: RADIOLOGY | Facility: MEDICAL CENTER | Age: 27
End: 2022-07-14
Attending: EMERGENCY MEDICINE
Payer: COMMERCIAL

## 2022-07-14 VITALS
BODY MASS INDEX: 28.91 KG/M2 | DIASTOLIC BLOOD PRESSURE: 60 MMHG | SYSTOLIC BLOOD PRESSURE: 100 MMHG | RESPIRATION RATE: 18 BRPM | WEIGHT: 158.07 LBS | HEART RATE: 89 BPM | OXYGEN SATURATION: 97 % | TEMPERATURE: 98 F

## 2022-07-14 DIAGNOSIS — R53.1 WEAKNESS: ICD-10-CM

## 2022-07-14 DIAGNOSIS — R82.71 ASYMPTOMATIC BACTERIURIA IN PREGNANCY: ICD-10-CM

## 2022-07-14 DIAGNOSIS — O99.891 ASYMPTOMATIC BACTERIURIA IN PREGNANCY: ICD-10-CM

## 2022-07-14 DIAGNOSIS — Z3A.30 30 WEEKS GESTATION OF PREGNANCY: ICD-10-CM

## 2022-07-14 LAB
ALBUMIN SERPL BCP-MCNC: 3.5 G/DL (ref 3.2–4.9)
ALBUMIN/GLOB SERPL: 1.1 G/DL
ALP SERPL-CCNC: 76 U/L (ref 30–99)
ALT SERPL-CCNC: 5 U/L (ref 2–50)
ANION GAP SERPL CALC-SCNC: 10 MMOL/L (ref 7–16)
APPEARANCE UR: ABNORMAL
AST SERPL-CCNC: 10 U/L (ref 12–45)
BACTERIA #/AREA URNS HPF: ABNORMAL /HPF
BASOPHILS # BLD AUTO: 0 % (ref 0–1.8)
BASOPHILS # BLD: 0 K/UL (ref 0–0.12)
BILIRUB SERPL-MCNC: 0.2 MG/DL (ref 0.1–1.5)
BILIRUB UR QL STRIP.AUTO: NEGATIVE
BUN SERPL-MCNC: 7 MG/DL (ref 8–22)
CALCIUM SERPL-MCNC: 8.7 MG/DL (ref 8.5–10.5)
CHLORIDE SERPL-SCNC: 104 MMOL/L (ref 96–112)
CO2 SERPL-SCNC: 21 MMOL/L (ref 20–33)
COLOR UR: YELLOW
CREAT SERPL-MCNC: 0.54 MG/DL (ref 0.5–1.4)
EKG IMPRESSION: NORMAL
EOSINOPHIL # BLD AUTO: 0.08 K/UL (ref 0–0.51)
EOSINOPHIL NFR BLD: 0.9 % (ref 0–6.9)
EPI CELLS #/AREA URNS HPF: ABNORMAL /HPF
ERYTHROCYTE [DISTWIDTH] IN BLOOD BY AUTOMATED COUNT: 43.4 FL (ref 35.9–50)
GFR SERPLBLD CREATININE-BSD FMLA CKD-EPI: 129 ML/MIN/1.73 M 2
GLOBULIN SER CALC-MCNC: 3.3 G/DL (ref 1.9–3.5)
GLUCOSE SERPL-MCNC: 87 MG/DL (ref 65–99)
GLUCOSE UR STRIP.AUTO-MCNC: 100 MG/DL
HCT VFR BLD AUTO: 34.6 % (ref 37–47)
HGB BLD-MCNC: 11 G/DL (ref 12–16)
KETONES UR STRIP.AUTO-MCNC: NEGATIVE MG/DL
LEUKOCYTE ESTERASE UR QL STRIP.AUTO: ABNORMAL
LYMPHOCYTES # BLD AUTO: 0.72 K/UL (ref 1–4.8)
LYMPHOCYTES NFR BLD: 7.9 % (ref 22–41)
MANUAL DIFF BLD: NORMAL
MCH RBC QN AUTO: 27.2 PG (ref 27–33)
MCHC RBC AUTO-ENTMCNC: 31.8 G/DL (ref 33.6–35)
MCV RBC AUTO: 85.6 FL (ref 81.4–97.8)
MICRO URNS: ABNORMAL
MONOCYTES # BLD AUTO: 0.73 K/UL (ref 0–0.85)
MONOCYTES NFR BLD AUTO: 8 % (ref 0–13.4)
MORPHOLOGY BLD-IMP: NORMAL
MYELOCYTES NFR BLD MANUAL: 0.9 %
NEUTROPHILS # BLD AUTO: 7.49 K/UL (ref 2–7.15)
NEUTROPHILS NFR BLD: 81.4 % (ref 44–72)
NEUTS BAND NFR BLD MANUAL: 0.9 % (ref 0–10)
NITRITE UR QL STRIP.AUTO: NEGATIVE
NRBC # BLD AUTO: 0 K/UL
NRBC BLD-RTO: 0 /100 WBC
OVALOCYTES BLD QL SMEAR: NORMAL
PH UR STRIP.AUTO: 6 [PH] (ref 5–8)
PLATELET # BLD AUTO: 272 K/UL (ref 164–446)
PLATELET BLD QL SMEAR: NORMAL
PMV BLD AUTO: 9.6 FL (ref 9–12.9)
POIKILOCYTOSIS BLD QL SMEAR: NORMAL
POLYCHROMASIA BLD QL SMEAR: NORMAL
POTASSIUM SERPL-SCNC: 3.6 MMOL/L (ref 3.6–5.5)
PROT SERPL-MCNC: 6.8 G/DL (ref 6–8.2)
PROT UR QL STRIP: NEGATIVE MG/DL
RBC # BLD AUTO: 4.04 M/UL (ref 4.2–5.4)
RBC # URNS HPF: ABNORMAL /HPF
RBC BLD AUTO: PRESENT
RBC UR QL AUTO: NEGATIVE
SODIUM SERPL-SCNC: 135 MMOL/L (ref 135–145)
SP GR UR STRIP.AUTO: 1.02
TROPONIN T SERPL-MCNC: <6 NG/L (ref 6–19)
UROBILINOGEN UR STRIP.AUTO-MCNC: 0.2 MG/DL
WBC # BLD AUTO: 9.1 K/UL (ref 4.8–10.8)
WBC #/AREA URNS HPF: ABNORMAL /HPF

## 2022-07-14 PROCEDURE — 700105 HCHG RX REV CODE 258: Performed by: EMERGENCY MEDICINE

## 2022-07-14 PROCEDURE — 81001 URINALYSIS AUTO W/SCOPE: CPT

## 2022-07-14 PROCEDURE — 85025 COMPLETE CBC W/AUTO DIFF WBC: CPT

## 2022-07-14 PROCEDURE — 93005 ELECTROCARDIOGRAM TRACING: CPT

## 2022-07-14 PROCEDURE — A9270 NON-COVERED ITEM OR SERVICE: HCPCS | Performed by: EMERGENCY MEDICINE

## 2022-07-14 PROCEDURE — 84484 ASSAY OF TROPONIN QUANT: CPT

## 2022-07-14 PROCEDURE — 99284 EMERGENCY DEPT VISIT MOD MDM: CPT

## 2022-07-14 PROCEDURE — 93005 ELECTROCARDIOGRAM TRACING: CPT | Performed by: EMERGENCY MEDICINE

## 2022-07-14 PROCEDURE — 85007 BL SMEAR W/DIFF WBC COUNT: CPT

## 2022-07-14 PROCEDURE — 93970 EXTREMITY STUDY: CPT | Mod: 26 | Performed by: INTERNAL MEDICINE

## 2022-07-14 PROCEDURE — 80053 COMPREHEN METABOLIC PANEL: CPT

## 2022-07-14 PROCEDURE — 93970 EXTREMITY STUDY: CPT

## 2022-07-14 PROCEDURE — 36415 COLL VENOUS BLD VENIPUNCTURE: CPT

## 2022-07-14 PROCEDURE — 700102 HCHG RX REV CODE 250 W/ 637 OVERRIDE(OP): Performed by: EMERGENCY MEDICINE

## 2022-07-14 RX ORDER — CEPHALEXIN 500 MG/1
500 CAPSULE ORAL 3 TIMES DAILY
Qty: 15 CAPSULE | Refills: 0 | Status: SHIPPED | OUTPATIENT
Start: 2022-07-14 | End: 2022-07-19

## 2022-07-14 RX ORDER — SODIUM CHLORIDE 9 MG/ML
1000 INJECTION, SOLUTION INTRAVENOUS ONCE
Status: COMPLETED | OUTPATIENT
Start: 2022-07-14 | End: 2022-07-14

## 2022-07-14 RX ORDER — CEPHALEXIN 500 MG/1
500 CAPSULE ORAL ONCE
Status: COMPLETED | OUTPATIENT
Start: 2022-07-14 | End: 2022-07-14

## 2022-07-14 RX ADMIN — CEPHALEXIN 500 MG: 500 CAPSULE ORAL at 15:31

## 2022-07-14 RX ADMIN — SODIUM CHLORIDE 1000 ML: 9 INJECTION, SOLUTION INTRAVENOUS at 12:39

## 2022-07-14 NOTE — ED NOTES
Pt ambulated to and from restroom with steady gait.   Fluids infusing. No further needs at this time.

## 2022-07-14 NOTE — ED NOTES
Discharge instructions given to pt. Prescriptions sent to pt's pharmacy. Pt educated, verbalizes understanding. All belongings accounted for. Pt ambulated out of ED with steady gait to go home with friend. PIV removed and dressing applied.

## 2022-07-14 NOTE — ED NOTES
Patient reports weakness, dizziness, fatigue the past few days. She is 30 weeks pregnant and denies any bleeding at this time

## 2022-07-14 NOTE — DISCHARGE INSTRUCTIONS
You were seen in the ER for weakness and fatigue that has been worsening over the past 2 to 3 weeks in the context of pregnancy.  Thankfully, your labs and imaging do not reveal an acute abnormality that requires further work-up, consultation, or admission to the hospital.  Your urine did have bacteria in it but it was also a contaminated sample with many skin cells.  Regardless, we do treat pregnant women with antibiotics to prevent the bacteria from going to the kidneys, I have sent in a prescription for antibiotics, please take them as directed.  Your red blood cells are slightly low but this is unlikely to be the source of your symptoms today.  I would like you to drink at least 8 ounces of clear liquids every hour to maintain your hydration.  You should follow-up with your midwife and primary care physician this week for recheck.  Return with new or worsening symptoms.  Good luck, I hope you feel better soon!

## 2022-07-14 NOTE — ED PROVIDER NOTES
"ED Provider Note    CHIEF COMPLAINT  Chief Complaint   Patient presents with   • Weakness     Pt reports increased weakness and fatigue in the past \"few days.\"  Pt states, \"When I get up I feel I have to sit down.\"       HPI  Tierra Horn is a 26 y.o.  female at 30 weeks gestation who presents with a chief complaint of generalized weakness that has been ongoing throughout her entire pregnancy and has worsened over the past 2 to 3 weeks.  She has been taking prenatal vitamins and just started an iron supplement but has taken only 1 of these and nothing has improved her fatigue and weakness.  This morning, she developed some chest pain that she describes as heartburn which she has had intermittently throughout her pregnancy.  She took Tums and it resolved.  She endorses shortness of breath with activity but denies any fevers or chills, abdominal pain, nausea, vomiting, diarrhea, dysuria, vaginal bleeding, or loss of fluid per vagina.  Patient denies history of DVT or PE, she does have bilateral leg and hand swelling throughout her pregnancy, she denies hemoptysis, she denies long distance travel or surgeries.  She is not on exogenous hormones but is currently pregnant.  She denies any known history of hypertension, hyperlipidemia, diabetes, tobacco use, increased BMI, first-degree relative history of MI prior to the age of 60, personal history of MI, or stimulant/tobacco use.  The chest pain does not radiate to her back and there is no ripping or tearing quality.  She denies any IV drug use.  There was no positional component to the chest discomfort.  She has had no recent fevers.    REVIEW OF SYSTEMS  See HPI for further details.  Generalized weakness.  Pregnancy.  Chest pain.  Shortness of breath.  All other systems are negative.     PAST MEDICAL HISTORY   has a past medical history of Anxiety and Depression.    SOCIAL HISTORY  Social History     Tobacco Use   • Smoking status: Never Smoker   • Smokeless " tobacco: Never Used   Vaping Use   • Vaping Use: Never used   Substance and Sexual Activity   • Alcohol use: Not Currently   • Drug use: Not Currently     Types: Marijuana, Oral, Inhaled     Comment: gummy,noel,daily    • Sexual activity: Yes     Partners: Male     Birth control/protection: I.U.D., Spermicide     Comment: copper IUD 2016       SURGICAL HISTORY  patient denies any surgical history    CURRENT MEDICATIONS  Home Medications     Reviewed by Jennifer Lyn R.N. (Registered Nurse) on 07/14/22 at 1027  Med List Status: Not Addressed   Medication Last Dose Status   hydrOXYzine HCl (ATARAX) 25 MG Tab  Flagged for Removal   norgestrel-ethinyl estradiol (LOW-OGESTREL) 0.3-30 MG-MCG Tab  Flagged for Removal   Prenatal MV-Min-Fe Fum-FA-DHA (PRENATAL 1 PO)  Active                ALLERGIES  No Known Allergies    PHYSICAL EXAM  VITAL SIGNS: /70   Pulse 85   Temp 36.7 °C (98.1 °F)   Resp 18   Wt 71.7 kg (158 lb 1.1 oz)   LMP  (LMP Unknown)   SpO2 97%   BMI 28.91 kg/m²    Pulse ox interpretation: I interpret this pulse ox as normal.  Constitutional: Alert in no apparent distress.  HENT: No signs of trauma, Bilateral external ears normal, Nose normal.  Tacky mucous membranes.  Eyes: Pupils are equal and reactive, Conjunctiva normal, Non-icteric.   Neck: Normal range of motion, No tenderness, Supple, No stridor.   Lymphatic: No lymphadenopathy noted.   Cardiovascular: Regular rate and rhythm, no murmurs. Pulses symmetrical.  Thorax & Lungs: Normal breath sounds, No respiratory distress, No wheezing, No chest tenderness.   Abdomen: Bowel sounds normal, Soft, gravid, no tenderness, No masses, No pulsatile masses. No peritoneal signs.  Skin: Warm, Dry, No erythema, No rash.   Back: Normal alignment.  Extremities: No cyanosis.  No significant lower extremity edema.  Musculoskeletal: No major deformities noted.   Neurologic: Alert, normal strength and sensation in all 4 extremities.  No focal deficits noted.    Psychiatric: Affect normal, Judgment normal, Mood normal.     DIAGNOSTIC STUDIES / PROCEDURES    LABS  Results for orders placed or performed during the hospital encounter of 07/14/22   CBC WITH DIFFERENTIAL   Result Value Ref Range    WBC 9.1 4.8 - 10.8 K/uL    RBC 4.04 (L) 4.20 - 5.40 M/uL    Hemoglobin 11.0 (L) 12.0 - 16.0 g/dL    Hematocrit 34.6 (L) 37.0 - 47.0 %    MCV 85.6 81.4 - 97.8 fL    MCH 27.2 27.0 - 33.0 pg    MCHC 31.8 (L) 33.6 - 35.0 g/dL    RDW 43.4 35.9 - 50.0 fL    Platelet Count 272 164 - 446 K/uL    MPV 9.6 9.0 - 12.9 fL    Neutrophils-Polys 81.40 (H) 44.00 - 72.00 %    Lymphocytes 7.90 (L) 22.00 - 41.00 %    Monocytes 8.00 0.00 - 13.40 %    Eosinophils 0.90 0.00 - 6.90 %    Basophils 0.00 0.00 - 1.80 %    Nucleated RBC 0.00 /100 WBC    Neutrophils (Absolute) 7.49 (H) 2.00 - 7.15 K/uL    Lymphs (Absolute) 0.72 (L) 1.00 - 4.80 K/uL    Monos (Absolute) 0.73 0.00 - 0.85 K/uL    Eos (Absolute) 0.08 0.00 - 0.51 K/uL    Baso (Absolute) 0.00 0.00 - 0.12 K/uL    NRBC (Absolute) 0.00 K/uL   Comp Metabolic Panel   Result Value Ref Range    Sodium 135 135 - 145 mmol/L    Potassium 3.6 3.6 - 5.5 mmol/L    Chloride 104 96 - 112 mmol/L    Co2 21 20 - 33 mmol/L    Anion Gap 10.0 7.0 - 16.0    Glucose 87 65 - 99 mg/dL    Bun 7 (L) 8 - 22 mg/dL    Creatinine 0.54 0.50 - 1.40 mg/dL    Calcium 8.7 8.5 - 10.5 mg/dL    AST(SGOT) 10 (L) 12 - 45 U/L    ALT(SGPT) 5 2 - 50 U/L    Alkaline Phosphatase 76 30 - 99 U/L    Total Bilirubin 0.2 0.1 - 1.5 mg/dL    Albumin 3.5 3.2 - 4.9 g/dL    Total Protein 6.8 6.0 - 8.2 g/dL    Globulin 3.3 1.9 - 3.5 g/dL    A-G Ratio 1.1 g/dL   TROPONIN   Result Value Ref Range    Troponin T <6 6 - 19 ng/L   ESTIMATED GFR   Result Value Ref Range    GFR (CKD-EPI) 129 >60 mL/min/1.73 m 2   DIFFERENTIAL MANUAL   Result Value Ref Range    Bands-Stabs 0.90 0.00 - 10.00 %    Myelocytes 0.90 %    Manual Diff Status PERFORMED    PERIPHERAL SMEAR REVIEW   Result Value Ref Range    Peripheral Smear  Review see below    PLATELET ESTIMATE   Result Value Ref Range    Plt Estimation Normal    MORPHOLOGY   Result Value Ref Range    RBC Morphology Present     Polychromia 1+     Poikilocytosis 1+     Ovalocytes 1+    URINALYSIS    Specimen: Urine, Clean Catch   Result Value Ref Range    Color Yellow     Character Cloudy (A)     Specific Gravity 1.023 <1.035    Ph 6.0 5.0 - 8.0    Glucose 100 (A) Negative mg/dL    Ketones Negative Negative mg/dL    Protein Negative Negative mg/dL    Bilirubin Negative Negative    Urobilinogen, Urine 0.2 Negative    Nitrite Negative Negative    Leukocyte Esterase Moderate (A) Negative    Occult Blood Negative Negative    Micro Urine Req Microscopic    URINE MICROSCOPIC (W/UA)   Result Value Ref Range    WBC 10-20 (A) /hpf    RBC 0-2 /hpf    Bacteria Many (A) None /hpf    Epithelial Cells Many (A) /hpf   EKG   Result Value Ref Range    Report       [facility] Emergency Dept.    Test Date:  2022  Pt Name:    JONATHAN MIMS           Department: ER  MRN:        [id number]                      Room:  Gender:     Female                       Technician: [id number]  :        1995                   Requested By:ER TRIAGE PROTOCOL  Order #:    [id number]                    Reading MD: Norm Prabhakar MD    Measurements  Intervals                                Axis  Rate:       103                          P:          43  NJ:         126                          QRS:        59  QRSD:       85                           T:          -3  QT:         331  QTc:        435    Interpretive Statements  Sinus tachycardia  No previous ECG available for comparison  Electronically Signed On 2022 14:02:31 PDT by Norm Prabhakar MD       RADIOLOGY  US-EXTREMITY VENOUS LOWER BILAT   Final Result        COURSE & MEDICAL DECISION MAKING  Pertinent Labs & Imaging studies reviewed. (See chart for details)  This is a yunior 26-year-old G2, P1 female at 30 weeks gestation who is here  with fatigue.  She reports heartburn throughout her pregnancy and now has some shortness of breath with ambulation.  The symptoms are likely due to the growing fetus but EKG was obtained that did not suggest acute ischemia.  She has no cough or fever to suggest pneumonia and chest x-ray was deferred.  She was initially tachycardic upon arrival to triage but upon my evaluation her heart rate had improved to normal.  She is not tachypneic or hypoxic, has no history of DVT or PE, denies hemoptysis, and denies long distance travel or surgeries.  She does have some swelling in bilateral legs so DVT ultrasound will be performed but I have a lower suspicion that her symptoms represent PE.    CBC is without leukocytosis.  She does have a hemoglobin of 11 but denies any easy bleeding or bruising, denies any vaginal bleeding, melena, hematochezia, or hematemesis.  This is unlikely the etiology of her fatigue.  Metabolic panel demonstrates normal electrolytes, renal, and liver function.  Troponin is negative.    HEART score: 0    Thankfully, DVT ultrasound did not demonstrate thrombus in this further decreases my suspicion for PE.    Patient has full strength and sensation in all of her extremities.  She denies any numbness.  This is unlikely to represent etiology such as myasthenia gravis, transverse myelitis, multiple sclerosis.  I do not think that lumbar puncture at this time is indicated.    Patient was reevaluated at bedside.  She is resting comfortably.  She has no new complaints.  I think that she is safe for continued outpatient work-up and management and I have asked her to contact her primary care physician and midwife today to arrange close follow-up appointments.  She was discharged in good and stable condition with very strict return precautions.    The patient will return for worsening symptoms and is stable at the time of discharge. The patient verbalizes understanding and will comply.    HYDRATION  HYDRATION:  Based on the patient's presentation of Dehydration the patient was given IV fluids. IV Hydration was used because oral hydration was not adequate alone. Upon recheck following hydration, the patient was Improved.    FINAL IMPRESSION  1. Weakness     2. 30 weeks gestation of pregnancy     3. Asymptomatic bacteriuria in pregnancy  cephALEXin (KEFLEX) 500 MG Cap         Electronically signed by: Norm Prabhakar M.D., 7/14/2022 12:21 PM

## 2022-07-14 NOTE — ED TRIAGE NOTES
"Tierra Horn  26 y.o. female  Chief Complaint   Patient presents with   • Weakness     Pt reports increased weakness and fatigue in the past \"few days.\"  Pt states, \"When I get up I feel I have to sit down.\"       Pt amb to triage with via wheelchair for above complaint. Pt reports she was sent here by her midwife for low hemoglobin, on  was 13.1 and on  was 10.1.  Pt is 32 wks pregnant. .  Pt is alert and oriented, speaking in full sentences, follows commands and responds appropriately to questions. Not in any apparent distress. Respirations are even and unlabored.  EKG done prior to triage.  Pt placed in lobby. Pt educated on triage process. Pt encouraged to alert staff for any changes.  This RN in appropriate PPE during encounter.  Pt placed in mask as well    "

## 2022-08-09 ENCOUNTER — TELEMEDICINE (OUTPATIENT)
Dept: MEDICAL GROUP | Facility: MEDICAL CENTER | Age: 27
End: 2022-08-09
Payer: COMMERCIAL

## 2022-08-09 VITALS — WEIGHT: 160 LBS | TEMPERATURE: 98.5 F | BODY MASS INDEX: 29.44 KG/M2 | HEIGHT: 62 IN

## 2022-08-09 DIAGNOSIS — Z3A.36 36 WEEKS GESTATION OF PREGNANCY: ICD-10-CM

## 2022-08-09 DIAGNOSIS — D64.9 ANEMIA, UNSPECIFIED TYPE: ICD-10-CM

## 2022-08-09 PROCEDURE — 99213 OFFICE O/P EST LOW 20 MIN: CPT | Mod: 95 | Performed by: PHYSICIAN ASSISTANT

## 2022-08-09 ASSESSMENT — FIBROSIS 4 INDEX: FIB4 SCORE: 0.43

## 2022-08-09 NOTE — PROGRESS NOTES
"This visit was conducted utilizing secure and encrypted videoconferencing equipment, with the patient's consent.    Patient's identity was verified.          Chief Complaint   Patient presents with   • Letter for School/Work     Early maternity   • Lab Results     Discuss recent labs        HPI:     Tierra Horn is a 26 y.o. female. Patient is presenting to discuss: early maternity leave.     pt is 36 wk pregnant, has been under care of Adela Campbell [facility]. Last labs shows low Hb Hct, states her midwife started her on extra iron supplement. Has fatigue and tiredness, leg pain w varicose veins,  due date Sep 4th, Work at [employer] w/o any fan      Past medical, surgical, family, and social history is reviewed in Epic chart by me today.   Medications and allergies reviewed and updated in Epic chart by me today.          Objective:     Temp 36.9 °C (98.5 °F)   Ht 1.575 m (5' 2\")   Wt 72.6 kg (160 lb)  Body mass index is 29.26 kg/m².   Physical Exam:  Pain: sounds comfortable   Constitutional: Alert, no distress.  Eye: pupils Equal, conjunctiva clear,   Respiratory: Unlabored respiratory effort, speaking in full sentences, no audible wheezes.           Assessment and Plan:   The following treatment plan was discussed        1. Anemia, unspecified type  Last lab results were reviewed by me today    - VITAMIN B12; Future  - FOLATE; Future  - IRON/TOTAL IRON BIND; Future  - FERRITIN; Future    2. 36 weeks gestation of pregnancy  Pt works at [employer], has fatigue and tiredness and wants to go on early maternity leave.     Adela Campbell [facility].          F/U prn              "

## 2022-08-09 NOTE — LETTER
August 9, 2022         Patient: Tierra Horn   YOB: 1995   Date of Visit: 8/9/2022           To Whom it May Concern:    Tierra Horn was evaluated by me on 8/9/2022. She is 36 wks pregnant and her due date is on Sep 4th. Please allow her to go on early maternity leave if possible as patient has fatigue, tiredness and leg pain.     If you have any questions or concerns, please don't hesitate to call.        Sincerely,           Betsy Mahoney P.A.-C.  Electronically Signed

## 2022-10-04 ENCOUNTER — OFFICE VISIT (OUTPATIENT)
Dept: MEDICAL GROUP | Facility: MEDICAL CENTER | Age: 27
End: 2022-10-04
Payer: COMMERCIAL

## 2022-10-04 VITALS
OXYGEN SATURATION: 96 % | SYSTOLIC BLOOD PRESSURE: 96 MMHG | BODY MASS INDEX: 26.76 KG/M2 | DIASTOLIC BLOOD PRESSURE: 62 MMHG | WEIGHT: 145.4 LBS | RESPIRATION RATE: 16 BRPM | HEIGHT: 62 IN | HEART RATE: 64 BPM | TEMPERATURE: 97 F

## 2022-10-04 DIAGNOSIS — Z30.09 BIRTH CONTROL COUNSELING: ICD-10-CM

## 2022-10-04 PROCEDURE — 99213 OFFICE O/P EST LOW 20 MIN: CPT | Performed by: PHYSICIAN ASSISTANT

## 2022-10-04 ASSESSMENT — PATIENT HEALTH QUESTIONNAIRE - PHQ9: CLINICAL INTERPRETATION OF PHQ2 SCORE: 0

## 2022-10-04 ASSESSMENT — FIBROSIS 4 INDEX: FIB4 SCORE: 0.44

## 2022-10-04 NOTE — PROGRESS NOTES
"Chief Complaint   Patient presents with    Contraception     Discuss depo        HPI  Tierra Horn is a 27 y.o. female here today for birth control.    Pt is 4 wk postpartum, home delivery, she breast-feeding.          She is  Exam:  BP (!) 96/62 (BP Location: Left arm, Patient Position: Sitting)   Pulse 64   Temp 36.1 °C (97 °F) (Temporal)   Resp 16   Ht 1.575 m (5' 2\")   Wt 66 kg (145 lb 6.4 oz)   SpO2 96%       Constitutional: Alert, oriented in no acute distress.  Psych: Eye contact is good, speech goal directed, affect calm  Eyes: Conjunctiva non-injected, sclera non-icteric.  Lungs: Unlabored respiratory effort, clear to auscultation bilaterally with good excursion, no wheez or rhonci  CV: regular rate and rhythm. No lower extremity edema        A/P:    1. Birth control counseling  Advised to start depo at 6 wks    Advised to abstain from sexual activity for the first 6 weeks postpartum  F/U: 2 wks     "

## 2022-10-26 ENCOUNTER — NON-PROVIDER VISIT (OUTPATIENT)
Dept: MEDICAL GROUP | Facility: MEDICAL CENTER | Age: 27
End: 2022-10-26
Payer: COMMERCIAL

## 2022-10-26 DIAGNOSIS — Z30.09 BIRTH CONTROL COUNSELING: ICD-10-CM

## 2022-10-26 LAB
INT CON NEG: NEGATIVE
INT CON POS: POSITIVE
POC URINE PREGNANCY TEST: NEGATIVE

## 2022-10-26 PROCEDURE — 96372 THER/PROPH/DIAG INJ SC/IM: CPT | Performed by: PHYSICIAN ASSISTANT

## 2022-10-26 PROCEDURE — 81025 URINE PREGNANCY TEST: CPT | Performed by: PHYSICIAN ASSISTANT

## 2022-10-26 RX ORDER — MEDROXYPROGESTERONE ACETATE 150 MG/ML
150 INJECTION, SUSPENSION INTRAMUSCULAR ONCE
Status: COMPLETED | OUTPATIENT
Start: 2022-10-26 | End: 2022-10-26

## 2022-10-26 RX ADMIN — MEDROXYPROGESTERONE ACETATE 150 MG: 150 INJECTION, SUSPENSION INTRAMUSCULAR at 09:42

## 2022-10-26 NOTE — PROGRESS NOTES
Tierra Horn is a 27 y.o. female here for a non-provider visit for Depo injection.    Reason for injection: Contraceptives  Order in MAR?: Yes  Patient supplied?:No  Minimum interval has been met for this injection (per MAR order): Yes    Patient tolerated injection and no adverse effects were observed or reported: Yes    # of Administrations remaining in MAR: n/a

## 2023-01-18 ENCOUNTER — APPOINTMENT (OUTPATIENT)
Dept: MEDICAL GROUP | Facility: MEDICAL CENTER | Age: 28
End: 2023-01-18
Payer: COMMERCIAL

## 2023-02-07 NOTE — ED NOTES
-- DO NOT REPLY / DO NOT REPLY ALL --  -- Message is from [facility] ([facility]) --    General Patient Message: [facility] was calling regarding an unspun sample for the patient  Please return the call     Caller Information       Type Contact Phone/Fax    02/07/2023 08:57 AM CST Phone (Incoming) [facility] [phone]        Alternative phone number: n/a    Can a detailed message be left? Yes    Message Turnaround:     Is it Working Hours? Yes - Working Hours     [place]:    Please give this turnaround time to the caller:   \"This message will be sent to [state Provider's name]. The clinical team will fulfill your request as soon as they review your message.\"                 Report to CHANCE Gilliam

## 2023-02-14 ENCOUNTER — APPOINTMENT (OUTPATIENT)
Dept: MEDICAL GROUP | Facility: MEDICAL CENTER | Age: 28
End: 2023-02-14
Payer: COMMERCIAL

## 2023-03-23 ENCOUNTER — OFFICE VISIT (OUTPATIENT)
Dept: MEDICAL GROUP | Facility: MEDICAL CENTER | Age: 28
End: 2023-03-23
Payer: COMMERCIAL

## 2023-03-23 VITALS
SYSTOLIC BLOOD PRESSURE: 98 MMHG | TEMPERATURE: 98.5 F | WEIGHT: 153.8 LBS | HEART RATE: 82 BPM | DIASTOLIC BLOOD PRESSURE: 62 MMHG | OXYGEN SATURATION: 99 % | HEIGHT: 62 IN | BODY MASS INDEX: 28.3 KG/M2 | RESPIRATION RATE: 16 BRPM

## 2023-03-23 DIAGNOSIS — F41.1 GAD (GENERALIZED ANXIETY DISORDER): ICD-10-CM

## 2023-03-23 DIAGNOSIS — F41.0 PANIC ATTACK: ICD-10-CM

## 2023-03-23 DIAGNOSIS — Z30.09 GENERAL COUNSELING AND ADVICE ON FEMALE CONTRACEPTION: ICD-10-CM

## 2023-03-23 DIAGNOSIS — F43.10 PTSD (POST-TRAUMATIC STRESS DISORDER): ICD-10-CM

## 2023-03-23 DIAGNOSIS — F33.1 MODERATE EPISODE OF RECURRENT MAJOR DEPRESSIVE DISORDER (HCC): ICD-10-CM

## 2023-03-23 PROBLEM — Z97.5 IUD (INTRAUTERINE DEVICE) IN PLACE: Status: RESOLVED | Noted: 2021-11-30 | Resolved: 2023-03-23

## 2023-03-23 PROCEDURE — 99214 OFFICE O/P EST MOD 30 MIN: CPT | Performed by: BEHAVIOR ANALYST

## 2023-03-23 RX ORDER — PROPRANOLOL HYDROCHLORIDE 10 MG/1
10 TABLET ORAL 2 TIMES DAILY
Qty: 60 TABLET | Refills: 2 | Status: SHIPPED | OUTPATIENT
Start: 2023-03-23 | End: 2023-10-17 | Stop reason: SDUPTHER

## 2023-03-23 RX ORDER — MEDROXYPROGESTERONE ACETATE 150 MG/ML
150 INJECTION, SUSPENSION INTRAMUSCULAR ONCE
Status: COMPLETED | OUTPATIENT
Start: 2023-03-23 | End: 2023-03-23

## 2023-03-23 RX ORDER — ESCITALOPRAM OXALATE 10 MG/1
10 TABLET ORAL DAILY
Qty: 30 TABLET | Refills: 2 | Status: SHIPPED | OUTPATIENT
Start: 2023-03-23 | End: 2023-10-17 | Stop reason: SDUPTHER

## 2023-03-23 RX ORDER — ESCITALOPRAM OXALATE 10 MG/1
10 TABLET ORAL DAILY
Qty: 30 TABLET | Refills: 2 | Status: SHIPPED | OUTPATIENT
Start: 2023-03-23 | End: 2023-03-23

## 2023-03-23 RX ADMIN — MEDROXYPROGESTERONE ACETATE 150 MG: 150 INJECTION, SUSPENSION INTRAMUSCULAR at 16:44

## 2023-03-23 ASSESSMENT — ANXIETY QUESTIONNAIRES
5. BEING SO RESTLESS THAT IT IS HARD TO SIT STILL: MORE THAN HALF THE DAYS
6. BECOMING EASILY ANNOYED OR IRRITABLE: NEARLY EVERY DAY
1. FEELING NERVOUS, ANXIOUS, OR ON EDGE: NEARLY EVERY DAY
4. TROUBLE RELAXING: NEARLY EVERY DAY
GAD7 TOTAL SCORE: 19
IF YOU CHECKED OFF ANY PROBLEMS ON THIS QUESTIONNAIRE, HOW DIFFICULT HAVE THESE PROBLEMS MADE IT FOR YOU TO DO YOUR WORK, TAKE CARE OF THINGS AT HOME, OR GET ALONG WITH OTHER PEOPLE: VERY DIFFICULT
3. WORRYING TOO MUCH ABOUT DIFFERENT THINGS: MORE THAN HALF THE DAYS
7. FEELING AFRAID AS IF SOMETHING AWFUL MIGHT HAPPEN: NEARLY EVERY DAY
2. NOT BEING ABLE TO STOP OR CONTROL WORRYING: NEARLY EVERY DAY

## 2023-03-23 ASSESSMENT — PATIENT HEALTH QUESTIONNAIRE - PHQ9
5. POOR APPETITE OR OVEREATING: 1 - SEVERAL DAYS
CLINICAL INTERPRETATION OF PHQ2 SCORE: 4
SUM OF ALL RESPONSES TO PHQ QUESTIONS 1-9: 17

## 2023-03-23 ASSESSMENT — ENCOUNTER SYMPTOMS
PALPITATIONS: 1
NERVOUS/ANXIOUS: 1
DEPRESSION: 1

## 2023-03-23 ASSESSMENT — FIBROSIS 4 INDEX: FIB4 SCORE: 0.44

## 2023-03-23 NOTE — PROGRESS NOTES
Subjective:     CC:    Chief Complaint   Patient presents with    Mental Heatlh Problem     Last med didn't help.          HISTORY OF THE PRESENT ILLNESS: Patient is a 27 y.o. female. This pleasant patient is here today to establish care and discuss anxiety and depression, contraception. His/her prior PCP was Betsy Story PA-C.    Problem   General Counseling and Advice On Female Contraception   Ptsd (Post-Traumatic Stress Disorder)    Experienced trauma when her  cheated on her while pregnant.  Experiencing frequent flashbacks of the day that she found out about her 's infidelity.  This can lead to panic attacks and agitation.     Current Moderate Episode of Major Depressive Disorder (Hcc)    Tearful throughout visit. Symptoms have worsened over the last 7 months. hydroxyzine was not helpful. She previously took celexa and did not find this very helpful, she didn't feel any improvement. She took the celexa for about 6 months.     Depression Screening    Little interest or pleasure in doing things?  1 - several days   Feeling down, depressed , or hopeless? 3 - nearly every day   Trouble falling or staying asleep, or sleeping too much?  2 - more than half the days   Feeling tired or having little energy?  2 - more than half the days   Poor appetite or overeating?  1 - several days   Feeling bad about yourself - or that you are a failure or have let yourself or your family down? 3 - nearly every day   Trouble concentrating on things, such as reading the newspaper or watching television? 2 - more than half the days   Moving or speaking so slowly that other people could have noticed.  Or the opposite - being so fidgety or restless that you have been moving around a lot more than usual?  3 - nearly every day   Thoughts that you would be better off dead, or of hurting yourself?  0 - not at all   Patient Health Questionnaire Score: 17       If depressive symptoms identified deferred to follow up visit  unless specifically addressed in assesment and plan.    Interpretation of PHQ-9 Total Score   Score Severity   1-4 No Depression   5-9 Mild Depression   10-14 Moderate Depression   15-19 Moderately Severe Depression   20-27 Severe Depression       Eva (Generalized Anxiety Disorder)        3/23/2023     1:35 PM 11/30/2021     2:11 PM    EVA-7 ANXIETY SCALE FLOWSHEET   Feeling nervous, anxious, or on edge 3 3   Not being able to stop or control worrying 3 3   Worrying too much about different things 2 3   Trouble relaxing 3 3   Being so restless that it is hard to sit still 2 3   Becoming easily annoyed or irritable 3 3   Feeling afraid as if something awful might happen 3 3   EVA-7 Total Score 19 21   How difficult have these problems made it for you to do your work, take care of things at home, or get along with other people? Very difficult Somewhat difficult       Interpretation of EVA-7 Total Score   Score Severity   0-4 Minimal Anxiety  5-9 Mild Anxiety   10-14 Moderate Anxiety  15-21 Severy Anxiety    Challenging to work. Has gone home from work due to anxiety. Able to care for her family and son.      Panic Attack    She gets racing heart beat when she thinks about her  infidelity and will get very upset and agitated.      IUD (Intrauterine Device) in Place (Resolved)       Current Outpatient Medications   Medication Sig    propranolol (INDERAL) 10 MG Tab Take 1 Tablet by mouth 2 times a day.    escitalopram (LEXAPRO) 10 MG Tab Take 1 Tablet by mouth every day. Take 0.5 tab for 1 week then increase to full tablet        Social History     Socioeconomic History    Marital status: Single   Occupational History    Occupation: [employer]   Tobacco Use    Smoking status: Never    Smokeless tobacco: Never   Vaping Use    Vaping Use: Never used   Substance and Sexual Activity    Alcohol use: Not Currently    Drug use: Not Currently     Types: Marijuana, Oral, Inhaled     Comment: gummy,dab,daily      "Sexual activity: Yes     Partners: Male     Birth control/protection: I.U.D., Spermicide     Comment: copper IUD 2016       Family History   Problem Relation Age of Onset    Diabetes Maternal Grandfather        Health Maintenance: Completed    ROS: See HPI  Review of Systems   Constitutional:  Negative for malaise/fatigue.   Cardiovascular:  Positive for palpitations.   Psychiatric/Behavioral:  Positive for depression. Negative for suicidal ideas. The patient is nervous/anxious.        Objective:     Exam: BP 98/62 (BP Location: Left arm, Patient Position: Sitting, BP Cuff Size: Adult long)   Pulse 82   Temp 36.9 °C (98.5 °F) (Temporal)   Resp 16   Ht 1.575 m (5' 2\")   Wt 69.8 kg (153 lb 12.8 oz)   LMP 03/06/2023   SpO2 99%   BMI 28.13 kg/m²   Body mass index is 28.13 kg/m².    Physical Exam  Constitutional:       Appearance: Normal appearance.   HENT:      Head: Normocephalic and atraumatic.   Cardiovascular:      Pulses: Normal pulses.   Pulmonary:      Effort: Pulmonary effort is normal.   Musculoskeletal:      Cervical back: Normal range of motion.   Neurological:      General: No focal deficit present.      Mental Status: She is alert.                Assessment & Plan:     27 y.o. female with the following -     1. Moderate episode of recurrent major depressive disorder (HCC)  2. EVA (generalized anxiety disorder)  -Chronic problem with recent exacerbation due to traumatic events.  Scoring with moderately severe depression on PHQ-9 screening and severe anxiety on EVA-7.  No SI or HI today.  -- Counseled on importance of regular exercise, healthy diet, good sleep hygiene, and positive social interaction.   - Counseled patient on the importance of a combination of psychotherapy and antidepressant medication to treat depression and anxiety.  -Had thorough discussion with patient regarding the potential side effects of SSRIs.  Counseled patient on how we will titrate the escitalopram.  Reiterated to patient " that it can take 4 weeks to see initial effect and up to 8 weeks to see full therapeutic effect of the medication.  - Referral to Psychology  - Referral to Psychiatry  - escitalopram (LEXAPRO) 10 MG Tab; Take 1 Tablet by mouth every day. Take 0.5 tab for 1 week then increase to full tablet  Dispense: 30 Tablet; Refill: 2    3. General counseling and advice on female contraception  -She took 1 dose of Depo 6 months ago.  She desires to restart and understands now that she needs to come in every 3 months for administration.  No contraindication for restarting Depo Provera identified.   - Contraception counseling completed. LMP: 3/6/2023.  Discussed bringing her back within 7 days of her next.  She wishes to begin now and states that she has not had unprotected sex since her last period.   - Discussed various methods and effectiveness for contraception.  - I counseled her on how to begin the medicine and I also informed of when she should use a backup method for the next 7 days. I also counseled the patient that this is not 100% effective in preventing pregnancy.  - medroxyPROGESTERone (DEPO-PROVERA) injection 150 mg    4. Panic attack  -Intermittent, worsening.  -Recommended starting daily propanolol.  Counseled regarding possible side effects and them away.  - Referral to Psychology  - Referral to Psychiatry  - propranolol (INDERAL) 10 MG Tab; Take 1 Tablet by mouth 2 times a day.  Dispense: 60 Tablet; Refill: 2    5. PTSD (post-traumatic stress disorder)  - Referral to Psychology              Return in about 6 weeks (around 5/4/2023) for depression, PHQ9.    Please note that this dictation was created using voice recognition software. I have made every reasonable attempt to correct obvious errors, but I expect that there are errors of grammar and possibly content that I did not discover before finalizing the note.

## 2023-04-04 ENCOUNTER — TELEPHONE (OUTPATIENT)
Dept: BEHAVIORAL HEALTH | Facility: CLINIC | Age: 28
End: 2023-04-04
Payer: COMMERCIAL

## 2023-07-12 ENCOUNTER — NON-PROVIDER VISIT (OUTPATIENT)
Dept: MEDICAL GROUP | Facility: MEDICAL CENTER | Age: 28
End: 2023-07-12
Payer: COMMERCIAL

## 2023-07-12 DIAGNOSIS — Z30.42 ENCOUNTER FOR MANAGEMENT AND INJECTION OF INJECTABLE PROGESTIN CONTRACEPTIVE: ICD-10-CM

## 2023-07-12 PROCEDURE — 96372 THER/PROPH/DIAG INJ SC/IM: CPT | Performed by: BEHAVIOR ANALYST

## 2023-07-12 RX ORDER — MEDROXYPROGESTERONE ACETATE 150 MG/ML
150 INJECTION, SUSPENSION INTRAMUSCULAR ONCE
Status: DISCONTINUED | OUTPATIENT
Start: 2023-07-12 | End: 2023-07-12

## 2023-07-12 RX ORDER — MEDROXYPROGESTERONE ACETATE 150 MG/ML
150 INJECTION, SUSPENSION INTRAMUSCULAR ONCE
Status: COMPLETED | OUTPATIENT
Start: 2023-07-12 | End: 2023-07-12

## 2023-07-12 RX ADMIN — MEDROXYPROGESTERONE ACETATE 150 MG: 150 INJECTION, SUSPENSION INTRAMUSCULAR at 11:40

## 2023-07-12 NOTE — PROGRESS NOTES
Tierra Horn is a 27 y.o. here for a Depo Provera Injection.     Date of last Depo Provera Injection: 3/13/23  Current date within therapeutic range?: Yes   Urine pregnancy test done (needed if out of date range): no  Date of last office visit:3/13/23  Date of last pap (if > 21 years old)/ GYN exam: 1/2022  Dx: Contraceptive use    Patient tolerated injection and no adverse effects were observed or reported: Yes    # of Administrations remaining in MAR: few  Next injection due between 10/12/23 and 10/3/23.

## 2023-10-16 ENCOUNTER — APPOINTMENT (OUTPATIENT)
Dept: MEDICAL GROUP | Facility: MEDICAL CENTER | Age: 28
End: 2023-10-16
Payer: COMMERCIAL

## 2023-10-17 DIAGNOSIS — F41.0 PANIC ATTACK: ICD-10-CM

## 2023-10-17 DIAGNOSIS — F41.1 GAD (GENERALIZED ANXIETY DISORDER): ICD-10-CM

## 2023-10-17 DIAGNOSIS — F33.1 MODERATE EPISODE OF RECURRENT MAJOR DEPRESSIVE DISORDER (HCC): ICD-10-CM

## 2023-10-17 RX ORDER — ESCITALOPRAM OXALATE 10 MG/1
10 TABLET ORAL DAILY
Qty: 30 TABLET | Refills: 3 | Status: SHIPPED | OUTPATIENT
Start: 2023-10-17

## 2023-10-17 RX ORDER — PROPRANOLOL HYDROCHLORIDE 10 MG/1
10 TABLET ORAL 2 TIMES DAILY
Qty: 60 TABLET | Refills: 3 | Status: SHIPPED | OUTPATIENT
Start: 2023-10-17

## 2023-10-24 ENCOUNTER — NON-PROVIDER VISIT (OUTPATIENT)
Dept: MEDICAL GROUP | Facility: MEDICAL CENTER | Age: 28
End: 2023-10-24
Payer: COMMERCIAL

## 2023-10-24 DIAGNOSIS — Z30.09 GENERAL COUNSELING AND ADVICE ON FEMALE CONTRACEPTION: ICD-10-CM

## 2023-10-24 PROCEDURE — 96372 THER/PROPH/DIAG INJ SC/IM: CPT | Performed by: BEHAVIOR ANALYST

## 2023-10-24 RX ORDER — MEDROXYPROGESTERONE ACETATE 150 MG/ML
150 INJECTION, SUSPENSION INTRAMUSCULAR ONCE
Status: CANCELLED | OUTPATIENT
Start: 2023-10-24 | End: 2023-10-24

## 2023-10-24 RX ADMIN — MEDROXYPROGESTERONE ACETATE 150 MG: 150 INJECTION, SUSPENSION INTRAMUSCULAR at 10:45

## 2023-10-30 RX ORDER — MEDROXYPROGESTERONE ACETATE 150 MG/ML
150 INJECTION, SUSPENSION INTRAMUSCULAR ONCE
Status: COMPLETED | OUTPATIENT
Start: 2023-10-30 | End: 2023-10-24

## 2023-11-24 ENCOUNTER — HOSPITAL ENCOUNTER (EMERGENCY)
Facility: MEDICAL CENTER | Age: 28
End: 2023-11-24
Attending: STUDENT IN AN ORGANIZED HEALTH CARE EDUCATION/TRAINING PROGRAM
Payer: COMMERCIAL

## 2023-11-24 ENCOUNTER — APPOINTMENT (OUTPATIENT)
Dept: RADIOLOGY | Facility: MEDICAL CENTER | Age: 28
End: 2023-11-24
Attending: STUDENT IN AN ORGANIZED HEALTH CARE EDUCATION/TRAINING PROGRAM
Payer: COMMERCIAL

## 2023-11-24 VITALS
TEMPERATURE: 97.7 F | OXYGEN SATURATION: 99 % | RESPIRATION RATE: 15 BRPM | BODY MASS INDEX: 30.02 KG/M2 | HEIGHT: 62 IN | HEART RATE: 67 BPM | WEIGHT: 163.14 LBS | SYSTOLIC BLOOD PRESSURE: 119 MMHG | DIASTOLIC BLOOD PRESSURE: 72 MMHG

## 2023-11-24 DIAGNOSIS — M54.40 ACUTE MIDLINE LOW BACK PAIN WITH SCIATICA, SCIATICA LATERALITY UNSPECIFIED: ICD-10-CM

## 2023-11-24 LAB — HCG UR QL: NEGATIVE

## 2023-11-24 PROCEDURE — 99284 EMERGENCY DEPT VISIT MOD MDM: CPT

## 2023-11-24 PROCEDURE — 700102 HCHG RX REV CODE 250 W/ 637 OVERRIDE(OP): Performed by: STUDENT IN AN ORGANIZED HEALTH CARE EDUCATION/TRAINING PROGRAM

## 2023-11-24 PROCEDURE — 700101 HCHG RX REV CODE 250: Performed by: STUDENT IN AN ORGANIZED HEALTH CARE EDUCATION/TRAINING PROGRAM

## 2023-11-24 PROCEDURE — 81025 URINE PREGNANCY TEST: CPT

## 2023-11-24 PROCEDURE — 72100 X-RAY EXAM L-S SPINE 2/3 VWS: CPT

## 2023-11-24 PROCEDURE — A9270 NON-COVERED ITEM OR SERVICE: HCPCS | Performed by: STUDENT IN AN ORGANIZED HEALTH CARE EDUCATION/TRAINING PROGRAM

## 2023-11-24 RX ORDER — IBUPROFEN 600 MG/1
600 TABLET ORAL ONCE
Status: COMPLETED | OUTPATIENT
Start: 2023-11-24 | End: 2023-11-24

## 2023-11-24 RX ORDER — LIDOCAINE 4 G/G
1 PATCH TOPICAL ONCE
Status: DISCONTINUED | OUTPATIENT
Start: 2023-11-24 | End: 2023-11-24 | Stop reason: HOSPADM

## 2023-11-24 RX ORDER — ACETAMINOPHEN 500 MG
1000 TABLET ORAL ONCE
Status: COMPLETED | OUTPATIENT
Start: 2023-11-24 | End: 2023-11-24

## 2023-11-24 RX ORDER — OXYCODONE HYDROCHLORIDE 5 MG/1
5 TABLET ORAL ONCE
Status: COMPLETED | OUTPATIENT
Start: 2023-11-24 | End: 2023-11-24

## 2023-11-24 RX ORDER — CYCLOBENZAPRINE HCL 10 MG
10 TABLET ORAL ONCE
Status: COMPLETED | OUTPATIENT
Start: 2023-11-24 | End: 2023-11-24

## 2023-11-24 RX ORDER — ACETAMINOPHEN 500 MG
500-1000 TABLET ORAL EVERY 6 HOURS PRN
Qty: 30 TABLET | Refills: 0 | Status: SHIPPED | OUTPATIENT
Start: 2023-11-24

## 2023-11-24 RX ORDER — CYCLOBENZAPRINE HCL 5 MG
5-10 TABLET ORAL 3 TIMES DAILY PRN
Qty: 30 TABLET | Refills: 0 | Status: SHIPPED | OUTPATIENT
Start: 2023-11-24

## 2023-11-24 RX ORDER — IBUPROFEN 400 MG/1
400 TABLET ORAL EVERY 6 HOURS PRN
Qty: 30 TABLET | Refills: 0 | Status: SHIPPED | OUTPATIENT
Start: 2023-11-24

## 2023-11-24 RX ORDER — LIDOCAINE 50 MG/G
1 PATCH TOPICAL EVERY 24 HOURS
Qty: 10 PATCH | Refills: 0 | Status: SHIPPED | OUTPATIENT
Start: 2023-11-24

## 2023-11-24 RX ADMIN — LIDOCAINE 1 PATCH: 4 PATCH TOPICAL at 02:21

## 2023-11-24 RX ADMIN — IBUPROFEN 600 MG: 600 TABLET, FILM COATED ORAL at 03:17

## 2023-11-24 RX ADMIN — ACETAMINOPHEN 1000 MG: 500 TABLET, FILM COATED ORAL at 02:21

## 2023-11-24 RX ADMIN — CYCLOBENZAPRINE 10 MG: 10 TABLET, FILM COATED ORAL at 03:09

## 2023-11-24 RX ADMIN — OXYCODONE 5 MG: 5 TABLET ORAL at 04:02

## 2023-11-24 ASSESSMENT — PAIN DESCRIPTION - PAIN TYPE
TYPE: ACUTE PAIN
TYPE: ACUTE PAIN

## 2023-11-24 ASSESSMENT — FIBROSIS 4 INDEX: FIB4 SCORE: 0.46

## 2023-11-24 NOTE — ED TRIAGE NOTES
"Chief Complaint   Patient presents with    Low Back Pain     Pt reporting Left lower back pain that radiates down her leg. Pain started getting worse 3 days ago. Pt denies trauma or incontinence.        Pt is alert and oriented, speaking in full sentences, follows commands and responds appropriately to questions. Resperations are even and unlabored.      Pt placed in lobby. Pt educated on triage process. Pt encouraged to alert staff for any changes.     Patient and staff wearing appropriate PPE.    /80   Pulse 74   Temp 35.9 °C (96.6 °F) (Temporal)   Resp 14   Ht 1.575 m (5' 2\")   Wt 74 kg (163 lb 2.3 oz)   SpO2 97%    "

## 2023-11-24 NOTE — DISCHARGE INSTRUCTIONS
Take the medication pain medications as prescribed, if you lose control of your bowel movements, or unable to urinate, develop numbness around your rectum or inner thighs, or develop weakness in one of your legs, return immediately for reevaluation otherwise take the medications as prescribed, avoid heavy lifting, overuse, but also do not remain in bed, and follow-up with your PCP in the next 2 to 3 days

## 2023-11-24 NOTE — ED PROVIDER NOTES
ER Provider Note    Scribed for Harriett Knapp M.D. by Dave Moss. 11/24/2023   1:59 AM    Primary Care Provider: DEVORA Curtis    CHIEF COMPLAINT  Chief Complaint   Patient presents with    Low Back Pain     Pt reporting Left lower back pain that radiates down her leg. Pain started getting worse 3 days ago. Pt denies trauma or incontinence.        HPI/ROS  LIMITATION TO HISTORY   Select: : None  OUTSIDE HISTORIAN(S):  None    Tierra Horn is a 28 y.o. female who presents to the ED for evaluation of worsening sharp lower back pain onset 1 week ago. The patient reports associated symptoms of weakness in her left leg soreness with urination, but denies diarrhea, constipation, dysuria, numbness around her rectum or inner thighs, loss of bowel control, incontinence or difficulty voiding her bladder. The patient describes her pain radiating down her leg, causing difficulty with ambulation secondary to pain. She reports holding her son or doing laundry when she felt a pop in her lower back which initially caused no pain. She reports constant pain with walking, sitting, holding her son, and other daily activity. She reports she has attempted to treat her symptoms with Bengay, Tylenol, and ibuprofen with no alleviation. She reports taking 4 Ibuprofen this morning with no alleviation. The patient reports she has been unable to work the last two days due to pain with ambulation. She reports extension of her leg backwards exacerbates her pain. She reports minimal symptom alleviation when bringing her leg toward her chest.    PAST MEDICAL HISTORY  Past Medical History:   Diagnosis Date    Anxiety     Depression     IUD (intrauterine device) in place 11/30/2021     SURGICAL HISTORY  History reviewed. No pertinent surgical history.    FAMILY HISTORY  Family History   Problem Relation Age of Onset    Diabetes Maternal Grandfather      SOCIAL HISTORY   reports that she has never smoked. She has never  "used smokeless tobacco. She reports that she does not currently use alcohol. She reports that she does not currently use drugs after having used the following drugs: Marijuana, Oral, and Inhaled.    CURRENT MEDICATIONS  Discharge Medication List as of 11/24/2023  4:33 AM        CONTINUE these medications which have NOT CHANGED    Details   propranolol (INDERAL) 10 MG Tab Take 1 Tablet by mouth 2 times a day., Disp-60 Tablet, R-3, Normal      escitalopram (LEXAPRO) 10 MG Tab Take 1 Tablet by mouth every day. Take 0.5 tab for 1 week then increase to full tablet, Disp-30 Tablet, R-3, Normal           ALLERGIES  No Known Allergies     PHYSICAL EXAM  /80   Pulse 74   Temp 35.9 °C (96.6 °F) (Temporal)   Resp 14   Ht 1.575 m (5' 2\")   Wt 74 kg (163 lb 2.3 oz)   SpO2 97%   BMI 29.84 kg/m²    General: Non-toxic appearing, alert female sitting in bed.   Head: Normocephalic atraumatic  Eyes: Extraocular motion intact  Neck: Supple, no rigidity  Cardiovascular: Regular rate and rhythm no murmurs rubs or gallops  Respiratory: Clear to auscultation bilaterally, equal chest rise and fall, no increased work of breathing  Abdomen: Soft nontender no guarding  Musculoskeletal: Warm and well perfused, no peripheral edema  Back: Tenderness over the left lower paraspinal area  NEURO: 5 out of 5 strength with hip flexion/extension, knee flexion/distention, ankle flexion/plantarflexion, flexion/extension of the toes bilaterally.  Sensation intact light touch x4.   Alert and oriented x3.  2+ DTRs bilaterally.    Integumentary: No wounds or rashes     DIAGNOSTIC STUDIES    Labs:   Labs Reviewed   HCG QUALITATIVE UR       Radiology:   This attending emergency physician has independently interpreted the diagnostic imaging associated with this visit and is awaiting the final reading from the radiologist.   Preliminary interpretation is a follows: No fracture dislocation    Radiologist interpretation:   DX-LUMBAR SPINE-2 OR 3 VIEWS "   Final Result      1.  Unremarkable lumbar spine series.           INITIAL ASSESSMENT COURSE AND PLAN  Care Narrative 28-year-old previously healthy female presenting with acute onset lower back pain after a twisting injury 1 week ago, no history of prior, no red flag cauda equina symptoms, no fevers, vital signs are reassuring, patient has tried NSAIDs without improvement, presenting today for inability to perform her work secondary to pain.    1:59 AM - Patient was evaluated at bedside. Discussed plan of care including diagnostic Ordered for Dx-lumbar spine and beta-HCG qual to evaluate. The patient will be medicated with Asperflex 4% patch, Flexeril 10 mg PO, Tylenol 1000 mg PO, and Motrin 600 mg PO for her symptoms. Patient verbalizes understanding and support with my plan of care.  Differential diagnoses include but not limited to: Sciatica, considered cauda equina syndrome, considered fracture, considered space-occupying lesion, such as epidural abscess, or hematoma, no significant risk factors identified, no IV history of IV drug use, no trauma, no anticoagulation.    will obtain screening x-ray to evaluate for fracture, spondylolisthesis, x-ray is reassuring, see negative, patient provided analgesics, upon reassessment pain mildly improved, patient later having significant proved in pain, we discussed expected course, need for outpatient follow-up, indications for immediate return to the emergency department, patient verbalized understanding agreement with plan of care at this time and was discharged home in no acute distress, suspect likely herniated disc/nerve root compression, given no motor weakness, no emergent need for MRI imaging        DISPOSITION AND DISCUSSIONS      Escalation of care considered, and ultimately not performed: diagnostic imaging.  Considered MRI lumbar spine without contrast however no red flag symptoms, no motor weakness, do not feel this is indicated in the emergency department  at this time.      Decision tools and prescription drugs considered including, but not limited to: Pain Medications consider prescription for opioid pain medications however given patient is having significant pain improvement without opioids, feel the risks in a young otherwise healthy female  outweigh the this time. .      FINAL DIAGNOSIS  1. Acute midline low back pain with sciatica, sciatica laterality unspecified         Dave WRIGHT (Scribe), am scribing for, and in the presence of, Andre Knapp M.D..    Electronically signed by: Dave Moss (Scribe), 11/24/2023    IAndre M.D. personally performed the services described in this documentation, as scribed by Dave Moss in my presence, and it is both accurate and complete.      The note accurately reflects work and decisions made by me.  Andre Knapp M.D.  11/24/2023  8:02 PM

## 2023-12-01 ENCOUNTER — OFFICE VISIT (OUTPATIENT)
Dept: MEDICAL GROUP | Facility: MEDICAL CENTER | Age: 28
End: 2023-12-01
Payer: COMMERCIAL

## 2023-12-01 VITALS
WEIGHT: 167.2 LBS | HEIGHT: 62 IN | DIASTOLIC BLOOD PRESSURE: 60 MMHG | SYSTOLIC BLOOD PRESSURE: 102 MMHG | OXYGEN SATURATION: 98 % | RESPIRATION RATE: 18 BRPM | BODY MASS INDEX: 30.77 KG/M2 | TEMPERATURE: 98 F | HEART RATE: 103 BPM

## 2023-12-01 DIAGNOSIS — M54.42 ACUTE LEFT-SIDED LOW BACK PAIN WITH LEFT-SIDED SCIATICA: ICD-10-CM

## 2023-12-01 DIAGNOSIS — M54.16 LEFT LUMBAR RADICULOPATHY: ICD-10-CM

## 2023-12-01 DIAGNOSIS — Z23 NEED FOR HPV VACCINATION: ICD-10-CM

## 2023-12-01 PROCEDURE — 3074F SYST BP LT 130 MM HG: CPT | Performed by: FAMILY MEDICINE

## 2023-12-01 PROCEDURE — 90651 9VHPV VACCINE 2/3 DOSE IM: CPT | Performed by: FAMILY MEDICINE

## 2023-12-01 PROCEDURE — 90471 IMMUNIZATION ADMIN: CPT | Performed by: FAMILY MEDICINE

## 2023-12-01 PROCEDURE — 3078F DIAST BP <80 MM HG: CPT | Performed by: FAMILY MEDICINE

## 2023-12-01 PROCEDURE — 99213 OFFICE O/P EST LOW 20 MIN: CPT | Mod: 25 | Performed by: FAMILY MEDICINE

## 2023-12-01 RX ORDER — GABAPENTIN 100 MG/1
100-200 CAPSULE ORAL 3 TIMES DAILY
Qty: 180 CAPSULE | Refills: 2 | Status: SHIPPED | OUTPATIENT
Start: 2023-12-01

## 2023-12-01 ASSESSMENT — FIBROSIS 4 INDEX: FIB4 SCORE: 0.46

## 2023-12-01 NOTE — PROGRESS NOTES
Chief Complaint   Patient presents with    Follow-Up    Hospital Follow-up       Subjective:     HPI:   Tierra Horn presents today with the followin. Left lumbar radiculopathy/Acute left-sided low back pain with left-sided sciatica  Was seen at the emergency room with acute radiculopathy.  She was found to have some mild degenerative changes on x-ray but no MRI was performed.  This was a little over a week ago but the symptoms are persistent.  She is not able to walk steadily.  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  Discussed physical therapy.  Discussed imaging.  MRI ordered discussed and placed.  Letter written for work keeping her off another 2 weeks.    2. Need for HPV vaccination  Third HPV vaccine is due and is administered today, Gardasil 9.        Patient Active Problem List    Diagnosis Date Noted    General counseling and advice on female contraception 2023    PTSD (post-traumatic stress disorder) 2023    Current moderate episode of major depressive disorder (HCC) 2021    EVA (generalized anxiety disorder) 2021    Borderline personality disorder (HCC) 2021    Adjustment disorder with anxious mood 2020    Panic attack 2020    Chlamydia trachomatis infection of genitourinary site 2019       Current medicines (including changes today)  Current Outpatient Medications   Medication Sig Dispense Refill    gabapentin (NEURONTIN) 100 MG Cap Take 1-2 Capsules by mouth 3 times a day. 180 Capsule 2    cyclobenzaprine (FLEXERIL) 5 mg tablet Take 1-2 Tablets by mouth 3 times a day as needed for Mild Pain, Moderate Pain or Muscle Spasms. 30 Tablet 0    lidocaine (LIDODERM) 5 % Patch Place 1 Patch on the skin every 24 hours. 10 Patch 0    ibuprofen (MOTRIN) 400 MG Tab Take 1 Tablet by mouth every 6 hours as needed for Mild Pain or Moderate Pain. 30 Tablet 0    acetaminophen (TYLENOL) 500 MG Tab Take 1-2 Tablets  "by mouth every 6 hours as needed for Mild Pain or Moderate Pain. 30 Tablet 0    propranolol (INDERAL) 10 MG Tab Take 1 Tablet by mouth 2 times a day. 60 Tablet 3    escitalopram (LEXAPRO) 10 MG Tab Take 1 Tablet by mouth every day. Take 0.5 tab for 1 week then increase to full tablet 30 Tablet 3     No current facility-administered medications for this visit.       No Known Allergies    ROS: As per HPI       Objective:     /60   Pulse (!) 103   Temp 36.7 °C (98 °F)   Resp 18   Ht 1.575 m (5' 2\")   Wt 75.8 kg (167 lb 3.2 oz)   SpO2 98%  Body mass index is 30.58 kg/m².    Physical Exam:  Constitutional: Well-developed and well-nourished. Not diaphoretic. No distress. Lucid and fluent.  Skin: Skin is warm and dry. No rash noted.  Head: Atraumatic without lesions.  Eyes: Conjunctivae and extraocular motions are normal. Pupils are equal, round, and reactive to light. No scleral icterus.   Ears:  External ears unremarkable.   Neck: Supple, trachea midline. No thyromegaly present. No cervical or supraclavicular lymphadenopathy. No JVD or carotid bruits appreciated  Cardiovascular: Regular rate and rhythm.  Normal S1, S2 without murmur appreciated.  Chest: Effort normal. Clear to auscultation throughout. No adventitious sounds.   Back: Spinous process nontender today, significant spasm.  Patient shows the area of numbness which is consistent with an L4-5 radiculopathy though possibly some L5-S1.    Extremities: No cyanosis, clubbing, erythema, nor edema.   Neurological: Alert and oriented x 3.   Psychiatric:  Behavior, mood, and affect are appropriate.    X-ray result from ER reviewed, basically normal     Assessment and Plan:     28 y.o. female with the following issues:    1. Left lumbar radiculopathy  gabapentin (NEURONTIN) 100 MG Cap    MR-LUMBAR SPINE-W/O      2. Acute left-sided low back pain with left-sided sciatica  gabapentin (NEURONTIN) 100 MG Cap    MR-LUMBAR SPINE-W/O      3. Need for HPV vaccination "  9VHPV Vaccine 2-3 Dose IM            Followup: Return in about 6 weeks (around 1/12/2024), or if symptoms worsen or fail to improve.

## 2023-12-01 NOTE — LETTER
December 1, 2023        Patient: Tierra Horn   YOB: 1995   Date of Visit: 12/1/2023           To Whom It May Concern:    It is my medical opinion that Tierra Horn is off work from 11/21/2023 through 12/17/2023 for an acute medical problem.  The time off is medically necessary.  Anticipate return to work 12/18/23.    If you have any questions or concerns, please don't hesitate to call.    Sincerely,        Francisco Hannah M.D.    [facility] [address] [place] [address] [place] [id number] [phone] (Phone)  [phone] (Fax)

## 2023-12-20 ENCOUNTER — TELEPHONE (OUTPATIENT)
Dept: MEDICAL GROUP | Facility: MEDICAL CENTER | Age: 28
End: 2023-12-20
Payer: COMMERCIAL

## 2023-12-21 ENCOUNTER — TELEPHONE (OUTPATIENT)
Dept: MEDICAL GROUP | Facility: MEDICAL CENTER | Age: 28
End: 2023-12-21
Payer: COMMERCIAL

## 2023-12-21 NOTE — LETTER
December 21, 2023        Patient: Tierra Horn   YOB: 1995   Date of Visit: 12/21/2023           To Whom It May Concern:    It is my medical opinion that Tierra Horn is off work from 11/21/2023 through 12/27/2023 for an acute medical problem.  The time off is medically necessary.  Anticipate return to work 12/28/2023.    Sincerely,        Francisco Hannah M.D.    [address] [address]  [phone] (Phone)  [phone] (Fax)

## 2023-12-21 NOTE — TELEPHONE ENCOUNTER
Patient was seen by Dr Brielle palmer on 12/1/23 for acute low back Dr Hannah is not her PCP.   Patient would like an extension on her recent letter for work excusing her from work until her MRI of Lumbar Spine is completed.

## 2023-12-21 NOTE — TELEPHONE ENCOUNTER
Imaging unavailable until December 24.  Return to work note revised.  Now anticipate return to work 12/28/2023.

## 2023-12-21 NOTE — TELEPHONE ENCOUNTER
Phone Number Called: [phone] (home)       Call outcome: Spoke to patient regarding message below.    Message: letter at  here at [address] via [employer]

## 2023-12-24 ENCOUNTER — HOSPITAL ENCOUNTER (OUTPATIENT)
Dept: RADIOLOGY | Facility: MEDICAL CENTER | Age: 28
End: 2023-12-24
Attending: FAMILY MEDICINE
Payer: COMMERCIAL

## 2023-12-24 DIAGNOSIS — M54.42 ACUTE LEFT-SIDED LOW BACK PAIN WITH LEFT-SIDED SCIATICA: ICD-10-CM

## 2023-12-24 DIAGNOSIS — M54.16 LEFT LUMBAR RADICULOPATHY: ICD-10-CM

## 2023-12-24 PROCEDURE — 72148 MRI LUMBAR SPINE W/O DYE: CPT

## 2024-02-06 NOTE — ED NOTES
Anesthesia Post-op Note    Patient: Sal Wilson  Procedure(s) Performed: Transrectal ultrasound and biopsy of prostate (Prostate)  Anesthesia type: MAC    Vitals Value Taken Time   Temp 36.1 02/06/24 1212   Pulse 67 02/06/24 1212   Resp 12 02/06/24 1212   SpO2 93 % 02/06/24 1212   /73 02/06/24 1210   Vitals shown include unvalidated device data.      Patient Location: PACU Phase 1  Post-op Vital Signs:stable  Level of Consciousness: awake, participates in exam and sedated  Respiratory Status: spontaneous ventilation, unassisted and room air  Cardiovascular stable  Hydration: euvolemic  Pain Management: adequately controlled  Handoff: Handoff to receiving clinician was performed and questions were answered  Vomiting: none  Nausea: None  Airway Patency:patent  Post-op Assessment: awake, alert, appropriately conversant, or baseline, no complications, patient tolerated procedure well, no evidence of recall, dentition within defined limits, moving all extremities and No Corneal Abrasion      There were no known notable events for this encounter.                       RN assist: MD at bedside.

## 2025-04-21 ENCOUNTER — HOSPITAL ENCOUNTER (EMERGENCY)
Facility: MEDICAL CENTER | Age: 30
End: 2025-04-21
Attending: STUDENT IN AN ORGANIZED HEALTH CARE EDUCATION/TRAINING PROGRAM | Admitting: STUDENT IN AN ORGANIZED HEALTH CARE EDUCATION/TRAINING PROGRAM
Payer: MEDICAID

## 2025-04-21 VITALS
SYSTOLIC BLOOD PRESSURE: 130 MMHG | DIASTOLIC BLOOD PRESSURE: 66 MMHG | HEART RATE: 83 BPM | WEIGHT: 172 LBS | BODY MASS INDEX: 31.65 KG/M2 | RESPIRATION RATE: 18 BRPM | OXYGEN SATURATION: 96 % | TEMPERATURE: 97.9 F | HEIGHT: 62 IN

## 2025-04-21 DIAGNOSIS — O12.13 PROTEINURIA AFFECTING PREGNANCY IN THIRD TRIMESTER: ICD-10-CM

## 2025-04-21 LAB
ALBUMIN SERPL BCP-MCNC: 3 G/DL (ref 3.2–4.9)
ALBUMIN/GLOB SERPL: 0.9 G/DL
ALP SERPL-CCNC: 117 U/L (ref 30–99)
ALT SERPL-CCNC: 7 U/L (ref 2–50)
ANION GAP SERPL CALC-SCNC: 15 MMOL/L (ref 7–16)
APPEARANCE UR: CLEAR
AST SERPL-CCNC: 35 U/L (ref 12–45)
BASOPHILS # BLD AUTO: 0.4 % (ref 0–1.8)
BASOPHILS # BLD: 0.03 K/UL (ref 0–0.12)
BILIRUB SERPL-MCNC: 0.2 MG/DL (ref 0.1–1.5)
BILIRUB UR QL STRIP.AUTO: NEGATIVE
BUN SERPL-MCNC: 6 MG/DL (ref 8–22)
CALCIUM ALBUM COR SERPL-MCNC: 9.7 MG/DL (ref 8.5–10.5)
CALCIUM SERPL-MCNC: 8.9 MG/DL (ref 8.5–10.5)
CHLORIDE SERPL-SCNC: 105 MMOL/L (ref 96–112)
CO2 SERPL-SCNC: 15 MMOL/L (ref 20–33)
COLOR UR AUTO: YELLOW
CREAT SERPL-MCNC: 0.7 MG/DL (ref 0.5–1.4)
CREAT UR-MCNC: 53.9 MG/DL
EOSINOPHIL # BLD AUTO: 0.12 K/UL (ref 0–0.51)
EOSINOPHIL NFR BLD: 1.7 % (ref 0–6.9)
ERYTHROCYTE [DISTWIDTH] IN BLOOD BY AUTOMATED COUNT: 45.6 FL (ref 35.9–50)
GFR SERPLBLD CREATININE-BSD FMLA CKD-EPI: 119 ML/MIN/1.73 M 2
GLOBULIN SER CALC-MCNC: 3.4 G/DL (ref 1.9–3.5)
GLUCOSE SERPL-MCNC: 118 MG/DL (ref 65–99)
GLUCOSE UR QL STRIP.AUTO: NEGATIVE MG/DL
HCT VFR BLD AUTO: 32.6 % (ref 37–47)
HGB BLD-MCNC: 10.4 G/DL (ref 12–16)
IMM GRANULOCYTES # BLD AUTO: 0.06 K/UL (ref 0–0.11)
IMM GRANULOCYTES NFR BLD AUTO: 0.8 % (ref 0–0.9)
KETONES UR QL STRIP.AUTO: NEGATIVE MG/DL
LEUKOCYTE ESTERASE UR QL STRIP.AUTO: NEGATIVE
LYMPHOCYTES # BLD AUTO: 2.02 K/UL (ref 1–4.8)
LYMPHOCYTES NFR BLD: 28 % (ref 22–41)
MCH RBC QN AUTO: 27.1 PG (ref 27–33)
MCHC RBC AUTO-ENTMCNC: 31.9 G/DL (ref 32.2–35.5)
MCV RBC AUTO: 84.9 FL (ref 81.4–97.8)
MONOCYTES # BLD AUTO: 0.47 K/UL (ref 0–0.85)
MONOCYTES NFR BLD AUTO: 6.5 % (ref 0–13.4)
NEUTROPHILS # BLD AUTO: 4.52 K/UL (ref 1.82–7.42)
NEUTROPHILS NFR BLD: 62.6 % (ref 44–72)
NITRITE UR QL STRIP.AUTO: NEGATIVE
NRBC # BLD AUTO: 0 K/UL
NRBC BLD-RTO: 0 /100 WBC (ref 0–0.2)
PH UR STRIP.AUTO: 7 [PH] (ref 5–8)
PLATELET # BLD AUTO: 265 K/UL (ref 164–446)
PMV BLD AUTO: 10.7 FL (ref 9–12.9)
POTASSIUM SERPL-SCNC: 4.3 MMOL/L (ref 3.6–5.5)
PROT SERPL-MCNC: 6.4 G/DL (ref 6–8.2)
PROT UR QL STRIP: NEGATIVE MG/DL
PROT UR-MCNC: 17.1 MG/DL (ref 0–15)
PROT/CREAT UR: 317 MG/G (ref 10–107)
RBC # BLD AUTO: 3.84 M/UL (ref 4.2–5.4)
RBC UR QL AUTO: NEGATIVE
SODIUM SERPL-SCNC: 135 MMOL/L (ref 135–145)
SP GR UR STRIP.AUTO: 1.01 (ref 1–1.03)
UROBILINOGEN UR STRIP.AUTO-MCNC: 0.2 MG/DL
WBC # BLD AUTO: 7.2 K/UL (ref 4.8–10.8)

## 2025-04-21 PROCEDURE — 99282 EMERGENCY DEPT VISIT SF MDM: CPT | Mod: 25

## 2025-04-21 PROCEDURE — 700102 HCHG RX REV CODE 250 W/ 637 OVERRIDE(OP): Mod: UD

## 2025-04-21 PROCEDURE — A9270 NON-COVERED ITEM OR SERVICE: HCPCS | Mod: UD

## 2025-04-21 PROCEDURE — 84156 ASSAY OF PROTEIN URINE: CPT

## 2025-04-21 PROCEDURE — 81002 URINALYSIS NONAUTO W/O SCOPE: CPT

## 2025-04-21 PROCEDURE — 80053 COMPREHEN METABOLIC PANEL: CPT

## 2025-04-21 PROCEDURE — 82570 ASSAY OF URINE CREATININE: CPT

## 2025-04-21 PROCEDURE — 99283 EMERGENCY DEPT VISIT LOW MDM: CPT | Performed by: STUDENT IN AN ORGANIZED HEALTH CARE EDUCATION/TRAINING PROGRAM

## 2025-04-21 PROCEDURE — 36415 COLL VENOUS BLD VENIPUNCTURE: CPT

## 2025-04-21 PROCEDURE — 85025 COMPLETE CBC W/AUTO DIFF WBC: CPT

## 2025-04-21 PROCEDURE — 59025 FETAL NON-STRESS TEST: CPT

## 2025-04-21 RX ORDER — ACETAMINOPHEN 500 MG
1000 TABLET ORAL ONCE
Status: COMPLETED | OUTPATIENT
Start: 2025-04-21 | End: 2025-04-21

## 2025-04-21 RX ADMIN — ACETAMINOPHEN 1000 MG: 500 TABLET, FILM COATED ORAL at 13:42

## 2025-04-21 ASSESSMENT — PAIN SCALES - GENERAL: PAINLEVEL: 6

## 2025-04-21 NOTE — PROGRESS NOTES
"1125- Pt is a  at 38.1 weeks gestation with a franco IUP, is a pt of [facility].  Pt is presenting for complain generalized overall feeling unwell since . States that he  and child have a \"head cold\" and that she has felt sinus pressure and had coughing since Friday as well, sore throat but no fevers or chills. States that in the last two days she has felt worse with feelings of lightheadedness, new onset swelling of hands and feet, and a new onset pain in her inner thigh of right leg with swelling of her vein on the inside of her leg. Reports +FM, no current LOF or VB, and intermittent tripp huerta contractions. EFM/TOCO applied, call light within reach and encouraged to call with any needs.     1220- Dr. Luna in department; report given  1230- MD at bedside for pt evaluation.   1250- Report received from Dr. Luna; orders received for CBC/CMP and P/C urine ratio.  "

## 2025-04-21 NOTE — ED PROVIDER NOTES
OB ED EVALUATION NOTE    SUBJECTIVE:  Tierra Horn is a 29 y.o.,  at 38 weeks 1 day per patient report who presents for cough and cold symptoms. She denies vaginal bleeding, leakage of fluid, or contractions. She states the baby is moving normally.    Patient reports family members at home with URI symptoms over the last few weeks as well.  For the last approximately 1 week she has been having a sore throat, cough, rhinorrhea, intermittent lightheadedness.  For the last 2-3 days, she has had bandlike headaches, sparkling floaters in her vision that last for 15-30 seconds at a time.  She denies epigastric pain, nausea, vomiting.  She is also noticed and uncomfortable prominent blood vessel on her right inner thigh for the last 2-3 days that is more prominent and uncomfortable with standing and ambulation.  No redness, no history of varicose veins, no history of bleeding or clotting disorders, no family history of varicose veins.    Per patient, her blood pressures have been normal on her prenatal visits with her midwife, but she does not know what numbers they have been ranging.     Patient states she's had 2 previous vaginal deliveries, one in a hospital, and one at home.    Patient is followed by Adela Campbell at [facility]. Records are received by fax and reviewed revealing:   - Rh positive   - Declined RPR, GC/CT, hepatitis B and C, HIV.    - Group B strep negative   - Ultrasound at 32 weeks limited by advanced gestational age but visualized anatomy normal    I personally reviewed the past medical and surgical histories, as well as the problem list.  Patient Active Problem List    Diagnosis Date Noted    General counseling and advice on female contraception 2023    PTSD (post-traumatic stress disorder) 2023    Current moderate episode of major depressive disorder (HCC) 2021    EVA (generalized anxiety disorder) 2021    Borderline personality disorder (HCC)  11/30/2021    Adjustment disorder with anxious mood 08/19/2020    Panic attack 08/19/2020    Chlamydia trachomatis infection of genitourinary site 07/05/2019       OBJECTIVE:  Vital Signs:   Vitals:    04/21/25 1130   BP: 131/74   Pulse: 85   Resp: 18   Temp: 36.6 °C (97.9 °F)   SpO2: 96%     GEN: NAD  HEENT: Audible congestion, posterior oropharynx is erythematous, no tonsillar exudates or edema  Neck: No cervical lymphadenopathy. Palpable muscle spasm of the musculature of the left neck  Resp: LCTAB, no distress  CV: RRR, no m/r/g. Palpable and visible varicose vein of the right medial thigh that is mildly tender to palpation with no overlying erythema or warmth  Abd: soft, NT, gravid  Ext: trace edema    Pelvic: deferred    NST read: 140 bpm / moderate variability / accels present / no decels. Reactive NST.   [place]: no contractions    LABS / IMAGING:  Results for orders placed or performed during the hospital encounter of 04/21/25   POCT urinalysis device results    Collection Time: 04/21/25 12:18 PM   Result Value Ref Range    POC Color Yellow     POC Appearance Clear     POC Glucose Negative Negative mg/dL    POC Ketones Negative Negative mg/dL    POC Specific Gravity 1.015 1.005 - 1.030    POC Blood Negative Negative    POC Urine PH 7.0 5.0 - 8.0    POC Protein Negative Negative mg/dL    Bilirubin Negative Negative    Urobilinogen, Urine 0.2 Negative    POC Nitrites Negative Negative    POC Leukocyte Esterase Negative Negative   CBC WITH DIFFERENTIAL    Collection Time: 04/21/25  1:00 PM   Result Value Ref Range    WBC 7.2 4.8 - 10.8 K/uL    RBC 3.84 (L) 4.20 - 5.40 M/uL    Hemoglobin 10.4 (L) 12.0 - 16.0 g/dL    Hematocrit 32.6 (L) 37.0 - 47.0 %    MCV 84.9 81.4 - 97.8 fL    MCH 27.1 27.0 - 33.0 pg    MCHC 31.9 (L) 32.2 - 35.5 g/dL    RDW 45.6 35.9 - 50.0 fL    Platelet Count 265 164 - 446 K/uL    MPV 10.7 9.0 - 12.9 fL    Neutrophils-Polys 62.60 44.00 - 72.00 %    Lymphocytes 28.00 22.00 - 41.00 %     Monocytes 6.50 0.00 - 13.40 %    Eosinophils 1.70 0.00 - 6.90 %    Basophils 0.40 0.00 - 1.80 %    Immature Granulocytes 0.80 0.00 - 0.90 %    Nucleated RBC 0.00 0.00 - 0.20 /100 WBC    Neutrophils (Absolute) 4.52 1.82 - 7.42 K/uL    Lymphs (Absolute) 2.02 1.00 - 4.80 K/uL    Monos (Absolute) 0.47 0.00 - 0.85 K/uL    Eos (Absolute) 0.12 0.00 - 0.51 K/uL    Baso (Absolute) 0.03 0.00 - 0.12 K/uL    Immature Granulocytes (abs) 0.06 0.00 - 0.11 K/uL    NRBC (Absolute) 0.00 K/uL   Comp Metabolic Panel    Collection Time: 25  1:00 PM   Result Value Ref Range    Sodium 135 135 - 145 mmol/L    Potassium 4.3 3.6 - 5.5 mmol/L    Chloride 105 96 - 112 mmol/L    Co2 15 (L) 20 - 33 mmol/L    Anion Gap 15.0 7.0 - 16.0    Glucose 118 (H) 65 - 99 mg/dL    Bun 6 (L) 8 - 22 mg/dL    Creatinine 0.70 0.50 - 1.40 mg/dL    Calcium 8.9 8.5 - 10.5 mg/dL    Correct Calcium 9.7 8.5 - 10.5 mg/dL    AST(SGOT) 35 12 - 45 U/L    ALT(SGPT) 7 2 - 50 U/L    Alkaline Phosphatase 117 (H) 30 - 99 U/L    Total Bilirubin 0.2 0.1 - 1.5 mg/dL    Albumin 3.0 (L) 3.2 - 4.9 g/dL    Total Protein 6.4 6.0 - 8.2 g/dL    Globulin 3.4 1.9 - 3.5 g/dL    A-G Ratio 0.9 g/dL   PROTEIN/CREAT RATIO URINE    Collection Time: 25  1:00 PM   Result Value Ref Range    Total Protein, Urine 17.1 (H) 0.0 - 15.0 mg/dL    Creatinine, Random Urine 53.90 mg/dL    Protein Creatinine Ratio 317 (H) 10 - 107 mg/g   ESTIMATED GFR    Collection Time: 25  1:00 PM   Result Value Ref Range    GFR (CKD-EPI) 119 >60 mL/min/1.73 m 2     ASSESSMENT AND PLAN:  29 y.o.  at 38w 1d gestation who presented with multiple complaints includeing URI symptoms, varicose vein of the right leg, extremity swelling, headache with lightheadedness and vision floaters, left-sided neck pain.    # viral URI symptoms  Present greater than 1 week. Afebrile, no hypoxemia. No respiratory distress. No signs of pneumonia. CENTOR score 0.  Patient declined viral swab.  - Counseled on supportive  care for viral URI symptoms, use of Tylenol as needed for headaches    # proteinuria  # headache  # vision floaters  PIH labs performed due to patient concern of headache and vision changes.  UA negative for protein.  Labs did reveal elevated protein creatinine ratio to 314.  CBC showed mild anemia of pregnancy.  CMP without signs of preeclampsia.  Blood pressures remained normal throughout entire duration of OB triage visit. Headache improved with administration of Tylenol.  Low suspicion of preeclampsia, out of abundance of caution with elevated protein creatinine ratio will perform 24-hour urine protein collection.  Suspect headaches and vision floaters are more related to patient's current viral URI illness.  -24-hour urine protein collection ordered, patient instructed to start this tomorrow morning as the lab does close at 6 PM  -Follow-up with midwife for BP recheck  -May use Tylenol as needed for headache    # muscle spasm of neck  Patient description exam consistent with spasm of left-sided neck musculature.   - May use Tylenol as needed for pain  - Directed on and recommended stretching and massage  - Trial ice and heat on shoulder to see if either is beneficial    # Varicose vein of the right leg  Varicose vein on right medial thigh.  Extremity is warm and well-perfused without any asymmetric edema.  No overlying warmth or significant tenderness that would be suspicious for thrombophlebitis or infection.   - Recommended patient obtain thigh-high compression stocking to wear to help alleviate symptoms related to varicose vein, elevate lower extremities as needed    # Anemia of pregnancy  Patient hemoglobin was 10.4 today.  On review of records sent from her midwife, she has been anemic earlier in pregnancy as well.  Currently does not take an extra iron supplement outside of prenatal vitamin with iron.  -Patient advised of lab result today and asked about taking iron supplements.  She was advised she may  take iron and vitamin C every other day in addition to her prenatal vitamin to help improve anemia during pregnancy.  - Patient inquired about whether this would impact her ability to have a home birth as she is planning.  Advised patient she would need to discuss this with her midwife regarding her management and preferences.    # Pregnancy in third trimester  Patient receiving prenatal care through midwifery practice.  Records reviewed today revealing pregnancy screening labs for syphilis, gonorrhea and chlamydia, HIV, hepatitis B and C were declined during first trimester.  Patient reports she did not realize that she had Medicaid at the time she declined this labs but is requesting they be ordered here for her to complete.  -Reactive NST.  Fetal status reassuring.  -Labs ordered at patient's request for pregnancy screening, patient will return to have these drawn at outpatient lab when she returns her urine collection  - Advised patient she will be notified of these results and they will also be available to her on [employer] and copies may be obtained for her midwife from medical records department as well.    The patient was instructed to follow up with her midwife within 1 week. She was counseled to call or return for vaginal bleeding, regular contractions, leakage of fluid or decreased fetal movement.    Tasha Luna D.O.  PGY-1  [facility]

## 2025-04-22 NOTE — PROGRESS NOTES
1640- Dr Cedillo and Dr Luna in room to see pt. Pt to be discharged with 24 hour urine and prenatal lab orders.     1710- discharge teaching done. Pt educated regarding 24 hour urine collection, understanding verbalized.

## 2025-04-23 ENCOUNTER — RESULTS FOLLOW-UP (OUTPATIENT)
Dept: MEDICAL GROUP | Facility: CLINIC | Age: 30
End: 2025-04-23
Payer: MEDICAID

## 2025-04-23 ENCOUNTER — HOSPITAL ENCOUNTER (OUTPATIENT)
Dept: LAB | Facility: MEDICAL CENTER | Age: 30
End: 2025-04-23
Payer: MEDICAID

## 2025-04-23 DIAGNOSIS — O12.13 PROTEINURIA AFFECTING PREGNANCY IN THIRD TRIMESTER: ICD-10-CM

## 2025-04-23 LAB
C TRACH DNA SPEC QL NAA+PROBE: NEGATIVE
HBV CORE AB SERPL QL IA: NONREACTIVE
HCV AB SER QL: NORMAL
HIV 1+2 AB+HIV1 P24 AG SERPL QL IA: NORMAL
N GONORRHOEA DNA SPEC QL NAA+PROBE: NEGATIVE
PROT 24H UR-MCNC: 220 MG/24 HR (ref 30–150)
PROT 24H UR-MRATE: 20 MG/DL (ref 0–15)
SPECIMEN SOURCE: NORMAL
SPECIMEN VOL UR: 1100 ML
T PALLIDUM AB SER QL IA: NORMAL

## 2025-04-23 PROCEDURE — 87491 CHLMYD TRACH DNA AMP PROBE: CPT

## 2025-04-23 PROCEDURE — 81050 URINALYSIS VOLUME MEASURE: CPT

## 2025-04-23 PROCEDURE — 86780 TREPONEMA PALLIDUM: CPT

## 2025-04-23 PROCEDURE — 86787 VARICELLA-ZOSTER ANTIBODY: CPT

## 2025-04-23 PROCEDURE — 86803 HEPATITIS C AB TEST: CPT

## 2025-04-23 PROCEDURE — 36415 COLL VENOUS BLD VENIPUNCTURE: CPT

## 2025-04-23 PROCEDURE — 87591 N.GONORRHOEAE DNA AMP PROB: CPT

## 2025-04-23 PROCEDURE — 87389 HIV-1 AG W/HIV-1&-2 AB AG IA: CPT

## 2025-04-23 PROCEDURE — 86704 HEP B CORE ANTIBODY TOTAL: CPT

## 2025-04-23 PROCEDURE — 86694 HERPES SIMPLEX NES ANTBDY: CPT

## 2025-04-23 PROCEDURE — 84156 ASSAY OF PROTEIN URINE: CPT

## 2025-04-24 ENCOUNTER — APPOINTMENT (OUTPATIENT)
Dept: RADIOLOGY | Facility: MEDICAL CENTER | Age: 30
End: 2025-04-24
Payer: MEDICAID

## 2025-04-24 ENCOUNTER — HOSPITAL ENCOUNTER (OUTPATIENT)
Facility: MEDICAL CENTER | Age: 30
End: 2025-04-25
Attending: STUDENT IN AN ORGANIZED HEALTH CARE EDUCATION/TRAINING PROGRAM | Admitting: OBSTETRICS & GYNECOLOGY
Payer: MEDICAID

## 2025-04-24 LAB
FLUAV RNA SPEC QL NAA+PROBE: NEGATIVE
FLUBV RNA SPEC QL NAA+PROBE: NEGATIVE
RSV RNA SPEC QL NAA+PROBE: NEGATIVE
SARS-COV-2 RNA RESP QL NAA+PROBE: NOTDETECTED
SPECIMEN SOURCE: NORMAL

## 2025-04-24 PROCEDURE — A9270 NON-COVERED ITEM OR SERVICE: HCPCS | Mod: UD | Performed by: STUDENT IN AN ORGANIZED HEALTH CARE EDUCATION/TRAINING PROGRAM

## 2025-04-24 PROCEDURE — 59025 FETAL NON-STRESS TEST: CPT

## 2025-04-24 PROCEDURE — 700102 HCHG RX REV CODE 250 W/ 637 OVERRIDE(OP): Mod: UD | Performed by: STUDENT IN AN ORGANIZED HEALTH CARE EDUCATION/TRAINING PROGRAM

## 2025-04-24 PROCEDURE — 0241U HCHG SARS-COV-2 COVID-19 NFCT DS RESP RNA 4 TRGT MIC: CPT

## 2025-04-24 PROCEDURE — 99284 EMERGENCY DEPT VISIT MOD MDM: CPT

## 2025-04-24 RX ORDER — METOCLOPRAMIDE 10 MG/1
10 TABLET ORAL ONCE
Status: COMPLETED | OUTPATIENT
Start: 2025-04-24 | End: 2025-04-24

## 2025-04-24 RX ADMIN — METOCLOPRAMIDE 10 MG: 10 TABLET ORAL at 22:13

## 2025-04-24 ASSESSMENT — FIBROSIS 4 INDEX: FIB4 SCORE: 1.45

## 2025-04-24 ASSESSMENT — PAIN SCALES - GENERAL: PAINLEVEL: 6

## 2025-04-25 ENCOUNTER — APPOINTMENT (OUTPATIENT)
Dept: RADIOLOGY | Facility: MEDICAL CENTER | Age: 30
End: 2025-04-25
Payer: MEDICAID

## 2025-04-25 VITALS
DIASTOLIC BLOOD PRESSURE: 67 MMHG | OXYGEN SATURATION: 97 % | SYSTOLIC BLOOD PRESSURE: 131 MMHG | HEART RATE: 75 BPM | RESPIRATION RATE: 18 BRPM | TEMPERATURE: 97.7 F | WEIGHT: 172 LBS | BODY MASS INDEX: 31.46 KG/M2

## 2025-04-25 LAB — HSV1+2 IGG SER IA-ACNC: 0.81 IV

## 2025-04-25 PROCEDURE — 99221 1ST HOSP IP/OBS SF/LOW 40: CPT | Performed by: STUDENT IN AN ORGANIZED HEALTH CARE EDUCATION/TRAINING PROGRAM

## 2025-04-25 PROCEDURE — 76820 UMBILICAL ARTERY ECHO: CPT

## 2025-04-25 PROCEDURE — 76819 FETAL BIOPHYS PROFIL W/O NST: CPT | Mod: XU

## 2025-04-25 PROCEDURE — A9270 NON-COVERED ITEM OR SERVICE: HCPCS | Performed by: OBSTETRICS & GYNECOLOGY

## 2025-04-25 PROCEDURE — 59025 FETAL NON-STRESS TEST: CPT

## 2025-04-25 PROCEDURE — 700102 HCHG RX REV CODE 250 W/ 637 OVERRIDE(OP): Performed by: OBSTETRICS & GYNECOLOGY

## 2025-04-25 PROCEDURE — 76819 FETAL BIOPHYS PROFIL W/O NST: CPT

## 2025-04-25 PROCEDURE — G0378 HOSPITAL OBSERVATION PER HR: HCPCS

## 2025-04-25 RX ORDER — ACETAMINOPHEN 500 MG
1000 TABLET ORAL EVERY 4 HOURS PRN
Status: DISCONTINUED | OUTPATIENT
Start: 2025-04-25 | End: 2025-04-25 | Stop reason: HOSPADM

## 2025-04-25 RX ORDER — CALCIUM CARBONATE 500 MG/1
1000 TABLET, CHEWABLE ORAL DAILY
Status: DISCONTINUED | OUTPATIENT
Start: 2025-04-25 | End: 2025-04-25 | Stop reason: HOSPADM

## 2025-04-25 RX ADMIN — ANTACID TABLETS 1000 MG: 500 TABLET, CHEWABLE ORAL at 05:35

## 2025-04-25 ASSESSMENT — PATIENT HEALTH QUESTIONNAIRE - PHQ9
SUM OF ALL RESPONSES TO PHQ9 QUESTIONS 1 AND 2: 0
2. FEELING DOWN, DEPRESSED, IRRITABLE, OR HOPELESS: NOT AT ALL
1. LITTLE INTEREST OR PLEASURE IN DOING THINGS: NOT AT ALL

## 2025-04-25 NOTE — CARE PLAN
The patient is Stable - Low risk of patient condition declining or worsening    Shift Goals  Clinical Goals: Monitor VS and fetal well-being.  Patient Goals: Go home.    Progress made toward(s) clinical / shift goals:    Problem: Knowledge Deficit - L&D  Goal: Patient and family/caregivers will demonstrate understanding of plan of care, disease process/condition, diagnostic tests and medications  Outcome: Progressing   POC discussed with pt, pt verbalized understanding.   Problem: Risk for Infection and Impaired Wound Healing  Goal: Patient will remain free from infection  Outcome: Progressing   Hand hygiene and standard precautions implemented in care.   Problem: Risk for Venous Thromboembolism (VTE)  Goal: VTE prevention measures will be implemented and patient will remain free from VTE  Outcome: Progressing  Pt ambulatory.     Patient is not progressing towards the following goals: N/A.

## 2025-04-25 NOTE — DISCHARGE SUMMARY
Discharge Summary      Admit Date:   2025  8:43 PM   Discharge Date:  25 1000     Admitting diagnosis:  Decreased fetal movement  Supervision of low-risk first pregnancy  Discharge Diagnosis: Early term pregnancy  Pregnancy Complications: none       Past Medical History:   Diagnosis Date    Anxiety     Depression     IUD (intrauterine device) in place 2021       Medications:   Current Facility-Administered Medications Ordered in Epic   Medication Dose Route Frequency Provider Last Rate Last Admin    acetaminophen (Tylenol) tablet 1,000 mg  1,000 mg Oral Q4HRS PRN Sidney Campbell M.D.        calcium carbonate (Tums) chewable tab 1,000 mg  1,000 mg Oral DAILY Sidney Campbell M.D.   1,000 mg at 25 0535     No current [facility]-ordered outpatient medications on file.         Hospital Course:   29 y.o. , was admitted with the above mentioned diagnosis, underwent admission for observation due to DFM and a variable decel in triage. BPP initially 6/8, 2 off for breathing and repeat was 8/8. Dopplers were normal. She also c/o URI symptoms and a respiratory panel swab was negative. The patient has a home birth midwife and has been seeing her for prenatal care. Per the patient, she had an uncomplicated home birth delivery with her first child and is planning on a home birth for this baby.     This morning the patient reports feeling better this morning in regards to her URI symptoms. She is also feeling the baby much more after the ultrasound. She plans on continuing to have a home birth. She feels reassured and is ready for discharge.     Vital measurements:  /67   Pulse 75   Temp 36.5 °C (97.7 °F) (Temporal)   Resp 18   Wt 78 kg (172 lb)   SpO2 97%   Body mass index is 31.46 kg/m². (Goal BM I>18 <25)      Exam:  Gen: Patient resting comfortably in no acute distress, nourished well-developed  Cardio: Appears well-perfused  Pulm: Speaking in clear sentences without increased work of  breathing  Skin: No obvious rashes  Abdominal exam: deferred    FHT , mod melvin, pos accels, no decels, CTX none. Reactive    IMPRESSION:     1.  Biophysical profile score is 6 out of 8 (movement 2, tone 2, breathing 0, fluid 2).  2.  Normal umbilical artery Doppler.        Exam Ended: 04/25/25 12:13 AM     FINDINGS:  Single intrauterine gestation is identified which has a heart rate of 136 bpm.     Amniotic fluid volume is 8.64 cm.     Biophysical profile score is 8  out of 8 (movement 2, tone 2, breathing 2, fluid 2).     IMPRESSION:        1. Normal biophysical profile ultrasound.              Exam Ended: 04/25/25  7:41 AM     Labs:  Lab:  Recent Results (from the past 6 weeks)   POCT urinalysis device results    Collection Time: 04/21/25 12:18 PM   Result Value Ref Range    POC Color Yellow     POC Appearance Clear     POC Glucose Negative Negative mg/dL    POC Ketones Negative Negative mg/dL    POC Specific Gravity 1.015 1.005 - 1.030    POC Blood Negative Negative    POC Urine PH 7.0 5.0 - 8.0    POC Protein Negative Negative mg/dL    Bilirubin Negative Negative    Urobilinogen, Urine 0.2 Negative    POC Nitrites Negative Negative    POC Leukocyte Esterase Negative Negative   CBC WITH DIFFERENTIAL    Collection Time: 04/21/25  1:00 PM   Result Value Ref Range    WBC 7.2 4.8 - 10.8 K/uL    RBC 3.84 (L) 4.20 - 5.40 M/uL    Hemoglobin 10.4 (L) 12.0 - 16.0 g/dL    Hematocrit 32.6 (L) 37.0 - 47.0 %    MCV 84.9 81.4 - 97.8 fL    MCH 27.1 27.0 - 33.0 pg    MCHC 31.9 (L) 32.2 - 35.5 g/dL    RDW 45.6 35.9 - 50.0 fL    Platelet Count 265 164 - 446 K/uL    MPV 10.7 9.0 - 12.9 fL    Neutrophils-Polys 62.60 44.00 - 72.00 %    Lymphocytes 28.00 22.00 - 41.00 %    Monocytes 6.50 0.00 - 13.40 %    Eosinophils 1.70 0.00 - 6.90 %    Basophils 0.40 0.00 - 1.80 %    Immature Granulocytes 0.80 0.00 - 0.90 %    Nucleated RBC 0.00 0.00 - 0.20 /100 WBC    Neutrophils (Absolute) 4.52 1.82 - 7.42 K/uL    Lymphs (Absolute) 2.02 1.00  - 4.80 K/uL    Monos (Absolute) 0.47 0.00 - 0.85 K/uL    Eos (Absolute) 0.12 0.00 - 0.51 K/uL    Baso (Absolute) 0.03 0.00 - 0.12 K/uL    Immature Granulocytes (abs) 0.06 0.00 - 0.11 K/uL    NRBC (Absolute) 0.00 K/uL   Comp Metabolic Panel    Collection Time: 04/21/25  1:00 PM   Result Value Ref Range    Sodium 135 135 - 145 mmol/L    Potassium 4.3 3.6 - 5.5 mmol/L    Chloride 105 96 - 112 mmol/L    Co2 15 (L) 20 - 33 mmol/L    Anion Gap 15.0 7.0 - 16.0    Glucose 118 (H) 65 - 99 mg/dL    Bun 6 (L) 8 - 22 mg/dL    Creatinine 0.70 0.50 - 1.40 mg/dL    Calcium 8.9 8.5 - 10.5 mg/dL    Correct Calcium 9.7 8.5 - 10.5 mg/dL    AST(SGOT) 35 12 - 45 U/L    ALT(SGPT) 7 2 - 50 U/L    Alkaline Phosphatase 117 (H) 30 - 99 U/L    Total Bilirubin 0.2 0.1 - 1.5 mg/dL    Albumin 3.0 (L) 3.2 - 4.9 g/dL    Total Protein 6.4 6.0 - 8.2 g/dL    Globulin 3.4 1.9 - 3.5 g/dL    A-G Ratio 0.9 g/dL   PROTEIN/CREAT RATIO URINE    Collection Time: 04/21/25  1:00 PM   Result Value Ref Range    Total Protein, Urine 17.1 (H) 0.0 - 15.0 mg/dL    Creatinine, Random Urine 53.90 mg/dL    Protein Creatinine Ratio 317 (H) 10 - 107 mg/g   ESTIMATED GFR    Collection Time: 04/21/25  1:00 PM   Result Value Ref Range    GFR (CKD-EPI) 119 >60 mL/min/1.73 m 2   URINETOTAL PROTEIN 24 HR    Collection Time: 04/23/25  9:15 AM   Result Value Ref Range    Total Volume, Urine 1100 mL    Total Protein, Urine 20.0 (H) 0.0 - 15.0 mg/dL    Total Protein, 24 Hour Urine 220.0 (H) 30.0 - 150.0 mg/24 Hr   Chlamydia/GC, PCR (Urine)    Collection Time: 04/23/25 10:27 AM    Specimen: Urine   Result Value Ref Range    C. trachomatis by PCR Negative Negative    Gc By Dna Probe Negative Negative    Source Urine    HIV AG/AB COMBO ASSAY SCREENING    Collection Time: 04/23/25 10:27 AM   Result Value Ref Range    HIV Ag/Ab Combo Assay Non-Reactive Non Reactive   HEP B CORE AB TOTAL    Collection Time: 04/23/25 10:27 AM   Result Value Ref Range    Hepatitis B Core Ab, Total  NonReactive Non-Reactive   HEP C VIRUS ANTIBODY    Collection Time: 25 10:27 AM   Result Value Ref Range    Hepatitis C Antibody Non-Reactive Non-Reactive   T.PALLIDUM AB TOMAS (SCREENING)    Collection Time: 25 10:27 AM   Result Value Ref Range    Syphilis, Treponemal Qual Non-Reactive Non-Reactive   CoV-2, Flu A/B, And RSV by PCR (CepRV IDid)    Collection Time: 25 10:15 PM    Specimen: Nasopharyngeal; Respirate   Result Value Ref Range    Influenza virus A RNA Negative Negative    Influenza virus B, PCR Negative Negative    RSV, PCR Negative Negative    SARS-CoV-2 by PCR NotDetected     SARS-CoV-2 Source NP Swab      A/P  30yo  at 38+5weeks here for DFM and admitted for OBS d/t variable decel and  BPP. BPP  on repeat. Pt feeling reassured with increase in fetal movement.     -pt cleared for discharge  -discussed very clearly that if she is to experience decreased fetal movement again, she is to return to triage for immediate evaluation. I also discussed that if this happens again, she should strongly consider a delivery in a hospital so the baby can be monitored continuously  -all questions answered and pt agreeable to plan of care    Kamala Lopez DO, FACOG  [facility]

## 2025-04-25 NOTE — PROGRESS NOTES
Report received from José Luis Almaraz RN. POC discussed. Pt will be Ante OBS. Transferred to 236. POC discussed with pt. RN encouraged pt to drink water to stay hydrated.     0421: Requested new orders from DEION Robledo CNM.     0535: Pt reports feeling a few FM, but states that it is not as much movement as normal.    0700: Report given to CHANCE Lim.

## 2025-04-25 NOTE — PROGRESS NOTES
0700- Received report from CHANCE Montgomery.     0704- Ultrasound at bedside for BPP.     0914- Received discharge orders from Dr. Lopez.     0977- Discharge instructions discussed, patient verbalized understanding. UC's, ROM, VB, abn FM, when to return to L&D discussed. Patient escorted off unit without any complications.

## 2025-04-25 NOTE — ED PROVIDER NOTES
S: Pt is a 29 y.o.  at Unknown with Estimated Date of Delivery: None noted. who presents to triage c/o headache, some floaters in her vision, sinus pressure, cough, and sore throat. She was seen two days ago for similar symptoms, a 24 hour urine protein was performed and resulted negative today (220). She took Tylenol this morning and prior to coming in to the ED with no relief.     Denies VB, RUCs, LOF.  Reports normal FM.      O: Wt 78 kg (172 lb)   BMI 31.46 kg/m²   BP: 131/73; SpO2 96-97% on room air; maternal HR 82-84    NST:       FHR baseline: 145       Variability: moderate       Acclerations: present       Decelerations: one ?prolonged deceleration  [place]: no contractions on TOCO  SVE: deferred    Physical Exam  HENT:      Head:      Comments: Tender to palpation of sinuses under eyes, on forehead     Nose: Congestion present.   Cardiovascular:      Rate and Rhythm: Normal rate.   Pulmonary:      Effort: Pulmonary effort is normal. No respiratory distress.      Breath sounds: Normal breath sounds. No wheezing.   Musculoskeletal:         General: Normal range of motion.   Neurological:      Mental Status: She is alert and oriented to person, place, and time.           A/P  Patient Active Problem List    Diagnosis Date Noted    General counseling and advice on female contraception 2023    PTSD (post-traumatic stress disorder) 2023    Current moderate episode of major depressive disorder (HCC) 2021    EVA (generalized anxiety disorder) 2021    Borderline personality disorder (HCC) 2021    Adjustment disorder with anxious mood 2020    Panic attack 2020    Chlamydia trachomatis infection of genitourinary site 2019       #29 y.o.  @ 38w5d    #Fetal well-being  -NST reactive with on questionable deceleration; BPP ordered  -BPP 6/8 d/t no fetal breathing movements. Disc with MD Adrian--will observe overnight    #Headache  #Visual changes  -BP normal today  and normal at last visit, 24 hour urine also resulted showing no evidence of preeclampsia. Symptoms likely d/t URI    #URI  -Covd/flu/RSV swab ordered  -Discussed using a decongestant with lots of oral hydration     #Admit to antepartum for observation d/p equivocal BPP      Jany LAWRENCEM, APRN

## 2025-04-25 NOTE — H&P
Admission History and Physical      [place] is a 29 y.o. female  at 38w5d who presents for decreased fetal movement. She also has various URI symptoms including headache, cough, sore throat--Covid/flu/RSV test is pending. A 24 hour urine was previously collected due to vague preeclampsia symptoms and was negative.     Fetal monitoring in triage was overall reassuring though there was concerns regarding one questionable spontaneous variable deceleration into the 70s. A BPP was ordered and resulted .       Subjective:   denies contractions  denies pain  denies leaking of fluid  denies vaginal bleeding.   reports decreased fetal movement    ROS: Pertinent items are noted in HPI.    Past Medical History:   Diagnosis Date    Anxiety     Depression     IUD (intrauterine device) in place 2021     No past surgical history on file.  OB History    Para Term  AB Living   3        SAB IAB Ectopic Molar Multiple Live Births              # Outcome Date GA Lbr Nolan/2nd Weight Sex Type Anes PTL Lv   3 Current            2             1               Social History     Socioeconomic History    Marital status: Single     Spouse name: Not on file    Number of children: Not on file    Years of education: Not on file    Highest education level: Not on file   Occupational History    Occupation: [employer]   Tobacco Use    Smoking status: Never    Smokeless tobacco: Never   Vaping Use    Vaping status: Some Days    Substances: Nicotine   Substance and Sexual Activity    Alcohol use: Not Currently    Drug use: Not Currently     Types: Marijuana, Oral, Inhaled     Comment: gummy,dab,daily     Sexual activity: Yes     Partners: Male     Birth control/protection: I.U.D., Spermicide     Comment: copper IUD 2016   Other Topics Concern    Not on file   Social History Narrative    Not on file     Social Drivers of Health     Financial Resource Strain: Not on file   Food Insecurity: Not on  file   Transportation Needs: Not on file   Physical Activity: Not on file   Stress: Not on file   Social Connections: Not on file   Intimate Partner Violence: Not on file   Housing Stability: Not on file     Allergies: Patient has no known allergies.  No current facility-administered medications for this encounter.    [facility] with Adela Campbell at [facility] with following problems:  Patient Active Problem List    Diagnosis Date Noted    Indication for care in labor or delivery 04/25/2025    General counseling and advice on female contraception 03/23/2023    PTSD (post-traumatic stress disorder) 03/23/2023    Current moderate episode of major depressive disorder (HCC) 11/30/2021    EVA (generalized anxiety disorder) 11/30/2021    Borderline personality disorder (HCC) 11/30/2021    Adjustment disorder with anxious mood 08/19/2020    Panic attack 08/19/2020    Chlamydia trachomatis infection of genitourinary site 07/05/2019             Objective:      /73   Pulse 83   Temp 36.6 °C (97.9 °F) (Temporal)   Resp 16   Wt 78 kg (172 lb)   SpO2 97%     General:   no acute distress   Skin:   normal   HEENT:  PERRLA   Lungs:   CTA bilateral   Heart:   S1, S2 normal, no murmur, click, rub or gallop, regular rate and rhythm   Abdomen:   gravid, NT   EFW:  7#   Pelvis:  adequate   FHT:  140 BPM       Ultrasound for BPP:  IMPRESSION:     1.  Biophysical profile score is 6 out of 8 (movement 2, tone 2, breathing 0, fluid 2).  2.  Normal umbilical artery Doppler.      Lab Review  Lab:   Blood type: B     Recent Results (from the past 35 weeks)   POCT urinalysis device results    Collection Time: 04/21/25 12:18 PM   Result Value Ref Range    POC Color Yellow     POC Appearance Clear     POC Glucose Negative Negative mg/dL    POC Ketones Negative Negative mg/dL    POC Specific Gravity 1.015 1.005 - 1.030    POC Blood Negative Negative    POC Urine PH 7.0 5.0 - 8.0    POC Protein Negative Negative mg/dL     Bilirubin Negative Negative    Urobilinogen, Urine 0.2 Negative    POC Nitrites Negative Negative    POC Leukocyte Esterase Negative Negative   CBC WITH DIFFERENTIAL    Collection Time: 04/21/25  1:00 PM   Result Value Ref Range    WBC 7.2 4.8 - 10.8 K/uL    RBC 3.84 (L) 4.20 - 5.40 M/uL    Hemoglobin 10.4 (L) 12.0 - 16.0 g/dL    Hematocrit 32.6 (L) 37.0 - 47.0 %    MCV 84.9 81.4 - 97.8 fL    MCH 27.1 27.0 - 33.0 pg    MCHC 31.9 (L) 32.2 - 35.5 g/dL    RDW 45.6 35.9 - 50.0 fL    Platelet Count 265 164 - 446 K/uL    MPV 10.7 9.0 - 12.9 fL    Neutrophils-Polys 62.60 44.00 - 72.00 %    Lymphocytes 28.00 22.00 - 41.00 %    Monocytes 6.50 0.00 - 13.40 %    Eosinophils 1.70 0.00 - 6.90 %    Basophils 0.40 0.00 - 1.80 %    Immature Granulocytes 0.80 0.00 - 0.90 %    Nucleated RBC 0.00 0.00 - 0.20 /100 WBC    Neutrophils (Absolute) 4.52 1.82 - 7.42 K/uL    Lymphs (Absolute) 2.02 1.00 - 4.80 K/uL    Monos (Absolute) 0.47 0.00 - 0.85 K/uL    Eos (Absolute) 0.12 0.00 - 0.51 K/uL    Baso (Absolute) 0.03 0.00 - 0.12 K/uL    Immature Granulocytes (abs) 0.06 0.00 - 0.11 K/uL    NRBC (Absolute) 0.00 K/uL   Comp Metabolic Panel    Collection Time: 04/21/25  1:00 PM   Result Value Ref Range    Sodium 135 135 - 145 mmol/L    Potassium 4.3 3.6 - 5.5 mmol/L    Chloride 105 96 - 112 mmol/L    Co2 15 (L) 20 - 33 mmol/L    Anion Gap 15.0 7.0 - 16.0    Glucose 118 (H) 65 - 99 mg/dL    Bun 6 (L) 8 - 22 mg/dL    Creatinine 0.70 0.50 - 1.40 mg/dL    Calcium 8.9 8.5 - 10.5 mg/dL    Correct Calcium 9.7 8.5 - 10.5 mg/dL    AST(SGOT) 35 12 - 45 U/L    ALT(SGPT) 7 2 - 50 U/L    Alkaline Phosphatase 117 (H) 30 - 99 U/L    Total Bilirubin 0.2 0.1 - 1.5 mg/dL    Albumin 3.0 (L) 3.2 - 4.9 g/dL    Total Protein 6.4 6.0 - 8.2 g/dL    Globulin 3.4 1.9 - 3.5 g/dL    A-G Ratio 0.9 g/dL   PROTEIN/CREAT RATIO URINE    Collection Time: 04/21/25  1:00 PM   Result Value Ref Range    Total Protein, Urine 17.1 (H) 0.0 - 15.0 mg/dL    Creatinine, Random Urine  53.90 mg/dL    Protein Creatinine Ratio 317 (H) 10 - 107 mg/g   ESTIMATED GFR    Collection Time: 04/21/25  1:00 PM   Result Value Ref Range    GFR (CKD-EPI) 119 >60 mL/min/1.73 m 2   URINETOTAL PROTEIN 24 HR    Collection Time: 04/23/25  9:15 AM   Result Value Ref Range    Total Volume, Urine 1100 mL    Total Protein, Urine 20.0 (H) 0.0 - 15.0 mg/dL    Total Protein, 24 Hour Urine 220.0 (H) 30.0 - 150.0 mg/24 Hr   Chlamydia/GC, PCR (Urine)    Collection Time: 04/23/25 10:27 AM    Specimen: Urine   Result Value Ref Range    C. trachomatis by PCR Negative Negative    Gc By Dna Probe Negative Negative    Source Urine    HIV AG/AB COMBO ASSAY SCREENING    Collection Time: 04/23/25 10:27 AM   Result Value Ref Range    HIV Ag/Ab Combo Assay Non-Reactive Non Reactive   HEP B CORE AB TOTAL    Collection Time: 04/23/25 10:27 AM   Result Value Ref Range    Hepatitis B Core Ab, Total NonReactive Non-Reactive   HEP C VIRUS ANTIBODY    Collection Time: 04/23/25 10:27 AM   Result Value Ref Range    Hepatitis C Antibody Non-Reactive Non-Reactive   T.PALLIDUM AB TOMAS (SCREENING)    Collection Time: 04/23/25 10:27 AM   Result Value Ref Range    Syphilis, Treponemal Qual Non-Reactive Non-Reactive   CoV-2, Flu A/B, And RSV by PCR (CepRed Blue Voiceid)    Collection Time: 04/24/25 10:15 PM    Specimen: Nasopharyngeal; Respirate   Result Value Ref Range    Influenza virus A RNA Negative Negative    Influenza virus B, PCR Negative Negative    RSV, PCR Negative Negative    SARS-CoV-2 by PCR NotDetected     SARS-CoV-2 Source NP Swab           Assessment:   [place] at 38w5d  Labor status: Not in labor.  Obstetrical history significant for   Patient Active Problem List    Diagnosis Date Noted    Indication for care in labor or delivery 04/25/2025    General counseling and advice on female contraception 03/23/2023    PTSD (post-traumatic stress disorder) 03/23/2023    Current moderate episode of major depressive disorder (HCC) 11/30/2021     EVA (generalized anxiety disorder) 2021    Borderline personality disorder (HCC) 2021    Adjustment disorder with anxious mood 2020    Panic attack 2020    Chlamydia trachomatis infection of genitourinary site 2019   .      Plan:     # @ 38w5d     #Decreased fetal movement  -Admit for observation overnight d/t equivocal BPP () for extended fetal monitoring  -Continuous monitoring overnight    #URI symptoms   -Covid/flu/RSV swab pending  -Tylenol PRN    #Admit for observation  -Vitals q4 hours      ROCKY Flowers MD Attending

## 2025-04-25 NOTE — PROGRESS NOTES
at 38.4 presenting to labor and delivery with complaints of upper respiratory illness, HA, vision changes and back pain. TOCO and EFM applied, VS taken. Patient denies VB, LOF. Patient states she has had decreased fetal movement for the last 1.5 days.     : Report given to Jany HIDALGO. Orders received    0: Jany HIDALGO at bedside to assess patient.     2230: Discussed POC with Jany HIDALGO, orders received    0000: Discussed BPP with provider, observation orders received. Discussed persistent decreased fetal movement with ROCKY.     0010: Jany HIDALGO at bedside to discuss POC with patient.

## 2025-04-26 LAB — VZV IGG SER IA-ACNC: 0.63 S/CO

## 2025-05-02 ENCOUNTER — HOSPITAL ENCOUNTER (INPATIENT)
Facility: MEDICAL CENTER | Age: 30
LOS: 3 days | End: 2025-05-05
Attending: OBSTETRICS & GYNECOLOGY | Admitting: STUDENT IN AN ORGANIZED HEALTH CARE EDUCATION/TRAINING PROGRAM
Payer: MEDICAID

## 2025-05-02 LAB
ALBUMIN SERPL BCP-MCNC: 3.1 G/DL (ref 3.2–4.9)
ALBUMIN/GLOB SERPL: 0.9 G/DL
ALP SERPL-CCNC: 134 U/L (ref 30–99)
ALT SERPL-CCNC: 8 U/L (ref 2–50)
ANION GAP SERPL CALC-SCNC: 13 MMOL/L (ref 7–16)
AST SERPL-CCNC: 23 U/L (ref 12–45)
BASOPHILS # BLD AUTO: 0.5 % (ref 0–1.8)
BASOPHILS # BLD: 0.04 K/UL (ref 0–0.12)
BILIRUB SERPL-MCNC: <0.2 MG/DL (ref 0.1–1.5)
BUN SERPL-MCNC: 10 MG/DL (ref 8–22)
CALCIUM ALBUM COR SERPL-MCNC: 9.6 MG/DL (ref 8.5–10.5)
CALCIUM SERPL-MCNC: 8.9 MG/DL (ref 8.5–10.5)
CHLORIDE SERPL-SCNC: 106 MMOL/L (ref 96–112)
CO2 SERPL-SCNC: 18 MMOL/L (ref 20–33)
CREAT SERPL-MCNC: 0.79 MG/DL (ref 0.5–1.4)
CREAT UR-MCNC: 238 MG/DL
EOSINOPHIL # BLD AUTO: 0.04 K/UL (ref 0–0.51)
EOSINOPHIL NFR BLD: 0.5 % (ref 0–6.9)
ERYTHROCYTE [DISTWIDTH] IN BLOOD BY AUTOMATED COUNT: 47.1 FL (ref 35.9–50)
GFR SERPLBLD CREATININE-BSD FMLA CKD-EPI: 103 ML/MIN/1.73 M 2
GLOBULIN SER CALC-MCNC: 3.3 G/DL (ref 1.9–3.5)
GLUCOSE SERPL-MCNC: 103 MG/DL (ref 65–99)
HCT VFR BLD AUTO: 34.2 % (ref 37–47)
HGB BLD-MCNC: 11 G/DL (ref 12–16)
HOLDING TUBE BB 8507: NORMAL
IMM GRANULOCYTES # BLD AUTO: 0.07 K/UL (ref 0–0.11)
IMM GRANULOCYTES NFR BLD AUTO: 0.8 % (ref 0–0.9)
LYMPHOCYTES # BLD AUTO: 2.27 K/UL (ref 1–4.8)
LYMPHOCYTES NFR BLD: 25.7 % (ref 22–41)
MCH RBC QN AUTO: 27 PG (ref 27–33)
MCHC RBC AUTO-ENTMCNC: 32.2 G/DL (ref 32.2–35.5)
MCV RBC AUTO: 84 FL (ref 81.4–97.8)
MONOCYTES # BLD AUTO: 0.73 K/UL (ref 0–0.85)
MONOCYTES NFR BLD AUTO: 8.3 % (ref 0–13.4)
NEUTROPHILS # BLD AUTO: 5.68 K/UL (ref 1.82–7.42)
NEUTROPHILS NFR BLD: 64.2 % (ref 44–72)
NRBC # BLD AUTO: 0.02 K/UL
NRBC BLD-RTO: 0.2 /100 WBC (ref 0–0.2)
PLATELET # BLD AUTO: 271 K/UL (ref 164–446)
PMV BLD AUTO: 10.4 FL (ref 9–12.9)
POTASSIUM SERPL-SCNC: 3.8 MMOL/L (ref 3.6–5.5)
PROT SERPL-MCNC: 6.4 G/DL (ref 6–8.2)
PROT UR-MCNC: 95.4 MG/DL (ref 0–15)
PROT/CREAT UR: 401 MG/G (ref 10–107)
RBC # BLD AUTO: 4.07 M/UL (ref 4.2–5.4)
RUBV AB SER QL: 113 IU/ML
SODIUM SERPL-SCNC: 137 MMOL/L (ref 135–145)
T PALLIDUM AB SER QL IA: NORMAL
WBC # BLD AUTO: 8.8 K/UL (ref 4.8–10.8)

## 2025-05-02 PROCEDURE — 82570 ASSAY OF URINE CREATININE: CPT

## 2025-05-02 PROCEDURE — 36415 COLL VENOUS BLD VENIPUNCTURE: CPT

## 2025-05-02 PROCEDURE — 85025 COMPLETE CBC W/AUTO DIFF WBC: CPT

## 2025-05-02 PROCEDURE — 700105 HCHG RX REV CODE 258

## 2025-05-02 PROCEDURE — 302449 STATCHG TRIAGE ONLY (STATISTIC)

## 2025-05-02 PROCEDURE — 700111 HCHG RX REV CODE 636 W/ 250 OVERRIDE (IP): Mod: JZ

## 2025-05-02 PROCEDURE — 86762 RUBELLA ANTIBODY: CPT

## 2025-05-02 PROCEDURE — 770002 HCHG ROOM/CARE - OB PRIVATE (112)

## 2025-05-02 PROCEDURE — 86780 TREPONEMA PALLIDUM: CPT

## 2025-05-02 PROCEDURE — 84156 ASSAY OF PROTEIN URINE: CPT

## 2025-05-02 PROCEDURE — 80053 COMPREHEN METABOLIC PANEL: CPT

## 2025-05-02 PROCEDURE — 3E033VJ INTRODUCTION OF OTHER HORMONE INTO PERIPHERAL VEIN, PERCUTANEOUS APPROACH: ICD-10-PCS | Performed by: STUDENT IN AN ORGANIZED HEALTH CARE EDUCATION/TRAINING PROGRAM

## 2025-05-02 RX ORDER — ACETAMINOPHEN 500 MG
1000 TABLET ORAL
Status: COMPLETED | OUTPATIENT
Start: 2025-05-02 | End: 2025-05-03

## 2025-05-02 RX ORDER — ONDANSETRON 2 MG/ML
4 INJECTION INTRAMUSCULAR; INTRAVENOUS EVERY 6 HOURS PRN
Status: DISCONTINUED | OUTPATIENT
Start: 2025-05-02 | End: 2025-05-03 | Stop reason: HOSPADM

## 2025-05-02 RX ORDER — TERBUTALINE SULFATE 1 MG/ML
0.25 INJECTION SUBCUTANEOUS
Status: DISCONTINUED | OUTPATIENT
Start: 2025-05-02 | End: 2025-05-03 | Stop reason: HOSPADM

## 2025-05-02 RX ORDER — IBUPROFEN 800 MG/1
800 TABLET, FILM COATED ORAL
Status: COMPLETED | OUTPATIENT
Start: 2025-05-02 | End: 2025-05-03

## 2025-05-02 RX ORDER — ALUMINA, MAGNESIA, AND SIMETHICONE 2400; 2400; 240 MG/30ML; MG/30ML; MG/30ML
30 SUSPENSION ORAL EVERY 6 HOURS PRN
Status: DISCONTINUED | OUTPATIENT
Start: 2025-05-02 | End: 2025-05-03 | Stop reason: HOSPADM

## 2025-05-02 RX ORDER — LIDOCAINE HYDROCHLORIDE 10 MG/ML
20 INJECTION, SOLUTION INFILTRATION; PERINEURAL
Status: DISCONTINUED | OUTPATIENT
Start: 2025-05-02 | End: 2025-05-03 | Stop reason: HOSPADM

## 2025-05-02 RX ORDER — OXYTOCIN 10 [USP'U]/ML
10 INJECTION, SOLUTION INTRAMUSCULAR; INTRAVENOUS
Status: DISCONTINUED | OUTPATIENT
Start: 2025-05-02 | End: 2025-05-03 | Stop reason: HOSPADM

## 2025-05-02 RX ORDER — CALCIUM CARBONATE 500 MG/1
500 TABLET, CHEWABLE ORAL DAILY
COMMUNITY

## 2025-05-02 RX ORDER — ONDANSETRON 4 MG/1
4 TABLET, ORALLY DISINTEGRATING ORAL EVERY 6 HOURS PRN
Status: DISCONTINUED | OUTPATIENT
Start: 2025-05-02 | End: 2025-05-03 | Stop reason: HOSPADM

## 2025-05-02 RX ORDER — SODIUM CHLORIDE, SODIUM LACTATE, POTASSIUM CHLORIDE, CALCIUM CHLORIDE 600; 310; 30; 20 MG/100ML; MG/100ML; MG/100ML; MG/100ML
INJECTION, SOLUTION INTRAVENOUS CONTINUOUS
Status: DISCONTINUED | OUTPATIENT
Start: 2025-05-02 | End: 2025-05-03 | Stop reason: HOSPADM

## 2025-05-02 RX ADMIN — SODIUM CHLORIDE, POTASSIUM CHLORIDE, SODIUM LACTATE AND CALCIUM CHLORIDE: 600; 310; 30; 20 INJECTION, SOLUTION INTRAVENOUS at 22:55

## 2025-05-02 RX ADMIN — OXYTOCIN 2 MILLI-UNITS/MIN: 10 INJECTION, SOLUTION INTRAMUSCULAR; INTRAVENOUS at 22:57

## 2025-05-02 SDOH — ECONOMIC STABILITY: TRANSPORTATION INSECURITY
IN THE PAST 12 MONTHS, HAS LACK OF RELIABLE TRANSPORTATION KEPT YOU FROM MEDICAL APPOINTMENTS, MEETINGS, WORK OR FROM GETTING THINGS NEEDED FOR DAILY LIVING?: NO

## 2025-05-02 SDOH — ECONOMIC STABILITY: TRANSPORTATION INSECURITY
IN THE PAST 12 MONTHS, HAS THE LACK OF TRANSPORTATION KEPT YOU FROM MEDICAL APPOINTMENTS OR FROM GETTING MEDICATIONS?: NO

## 2025-05-02 ASSESSMENT — LIFESTYLE VARIABLES
AVERAGE NUMBER OF DAYS PER WEEK YOU HAVE A DRINK CONTAINING ALCOHOL: 0
CONSUMPTION TOTAL: NEGATIVE
HAVE PEOPLE ANNOYED YOU BY CRITICIZING YOUR DRINKING: NO
DOES PATIENT WANT TO STOP DRINKING: NO
TOTAL SCORE: 0
HOW MANY TIMES IN THE PAST YEAR HAVE YOU HAD 5 OR MORE DRINKS IN A DAY: 0
ALCOHOL_USE: NO
ON A TYPICAL DAY WHEN YOU DRINK ALCOHOL HOW MANY DRINKS DO YOU HAVE: 0
EVER HAD A DRINK FIRST THING IN THE MORNING TO STEADY YOUR NERVES TO GET RID OF A HANGOVER: NO
TOTAL SCORE: 0
EVER FELT BAD OR GUILTY ABOUT YOUR DRINKING: NO
HAVE YOU EVER FELT YOU SHOULD CUT DOWN ON YOUR DRINKING: NO
TOTAL SCORE: 0

## 2025-05-02 ASSESSMENT — SOCIAL DETERMINANTS OF HEALTH (SDOH)
WITHIN THE PAST 12 MONTHS, YOU WORRIED THAT YOUR FOOD WOULD RUN OUT BEFORE YOU GOT THE MONEY TO BUY MORE: NEVER TRUE
WITHIN THE PAST 12 MONTHS, THE FOOD YOU BOUGHT JUST DIDN'T LAST AND YOU DIDN'T HAVE MONEY TO GET MORE: NEVER TRUE
IN THE PAST 12 MONTHS, HAS THE ELECTRIC, GAS, OIL, OR WATER COMPANY THREATENED TO SHUT OFF SERVICE IN YOUR HOME?: NO
WITHIN THE LAST YEAR, HAVE YOU BEEN AFRAID OF YOUR PARTNER OR EX-PARTNER?: NO
WITHIN THE LAST YEAR, HAVE TO BEEN RAPED OR FORCED TO HAVE ANY KIND OF SEXUAL ACTIVITY BY YOUR PARTNER OR EX-PARTNER?: NO
WITHIN THE LAST YEAR, HAVE YOU BEEN KICKED, HIT, SLAPPED, OR OTHERWISE PHYSICALLY HURT BY YOUR PARTNER OR EX-PARTNER?: NO
WITHIN THE LAST YEAR, HAVE YOU BEEN HUMILIATED OR EMOTIONALLY ABUSED IN OTHER WAYS BY YOUR PARTNER OR EX-PARTNER?: NO

## 2025-05-02 ASSESSMENT — FIBROSIS 4 INDEX: FIB4 SCORE: 1.45

## 2025-05-02 ASSESSMENT — PAIN DESCRIPTION - PAIN TYPE: TYPE: ACUTE PAIN

## 2025-05-02 ASSESSMENT — PAIN SCALES - GENERAL: PAINLEVEL: 1

## 2025-05-03 LAB
ABO GROUP BLD: NORMAL
BASOPHILS # BLD AUTO: 0.4 % (ref 0–1.8)
BASOPHILS # BLD: 0.06 K/UL (ref 0–0.12)
BLD GP AB SCN SERPL QL: NORMAL
EOSINOPHIL # BLD AUTO: 0.09 K/UL (ref 0–0.51)
EOSINOPHIL NFR BLD: 0.7 % (ref 0–6.9)
ERYTHROCYTE [DISTWIDTH] IN BLOOD BY AUTOMATED COUNT: 46.9 FL (ref 35.9–50)
ERYTHROCYTE [DISTWIDTH] IN BLOOD BY AUTOMATED COUNT: 47.2 FL (ref 35.9–50)
HCT VFR BLD AUTO: 26.4 % (ref 37–47)
HCT VFR BLD AUTO: 31.4 % (ref 37–47)
HGB BLD-MCNC: 10.2 G/DL (ref 12–16)
HGB BLD-MCNC: 8.5 G/DL (ref 12–16)
IMM GRANULOCYTES # BLD AUTO: 0.14 K/UL (ref 0–0.11)
IMM GRANULOCYTES NFR BLD AUTO: 1 % (ref 0–0.9)
LYMPHOCYTES # BLD AUTO: 2.85 K/UL (ref 1–4.8)
LYMPHOCYTES NFR BLD: 20.7 % (ref 22–41)
MCH RBC QN AUTO: 27 PG (ref 27–33)
MCH RBC QN AUTO: 27.2 PG (ref 27–33)
MCHC RBC AUTO-ENTMCNC: 32.2 G/DL (ref 32.2–35.5)
MCHC RBC AUTO-ENTMCNC: 32.5 G/DL (ref 32.2–35.5)
MCV RBC AUTO: 83.1 FL (ref 81.4–97.8)
MCV RBC AUTO: 84.3 FL (ref 81.4–97.8)
MONOCYTES # BLD AUTO: 0.93 K/UL (ref 0–0.85)
MONOCYTES NFR BLD AUTO: 6.7 % (ref 0–13.4)
NEUTROPHILS # BLD AUTO: 9.72 K/UL (ref 1.82–7.42)
NEUTROPHILS NFR BLD: 70.5 % (ref 44–72)
NRBC # BLD AUTO: 0 K/UL
NRBC BLD-RTO: 0 /100 WBC (ref 0–0.2)
PLATELET # BLD AUTO: 214 K/UL (ref 164–446)
PLATELET # BLD AUTO: 260 K/UL (ref 164–446)
PMV BLD AUTO: 10.3 FL (ref 9–12.9)
PMV BLD AUTO: 9.9 FL (ref 9–12.9)
RBC # BLD AUTO: 3.13 M/UL (ref 4.2–5.4)
RBC # BLD AUTO: 3.78 M/UL (ref 4.2–5.4)
RH BLD: NORMAL
WBC # BLD AUTO: 13.8 K/UL (ref 4.8–10.8)
WBC # BLD AUTO: 15.4 K/UL (ref 4.8–10.8)

## 2025-05-03 PROCEDURE — 85027 COMPLETE CBC AUTOMATED: CPT

## 2025-05-03 PROCEDURE — 700102 HCHG RX REV CODE 250 W/ 637 OVERRIDE(OP)

## 2025-05-03 PROCEDURE — 86901 BLOOD TYPING SEROLOGIC RH(D): CPT

## 2025-05-03 PROCEDURE — 770002 HCHG ROOM/CARE - OB PRIVATE (112)

## 2025-05-03 PROCEDURE — 10907ZC DRAINAGE OF AMNIOTIC FLUID, THERAPEUTIC FROM PRODUCTS OF CONCEPTION, VIA NATURAL OR ARTIFICIAL OPENING: ICD-10-PCS

## 2025-05-03 PROCEDURE — 304965 HCHG RECOVERY SERVICES

## 2025-05-03 PROCEDURE — 700101 HCHG RX REV CODE 250

## 2025-05-03 PROCEDURE — A9270 NON-COVERED ITEM OR SERVICE: HCPCS | Performed by: OBSTETRICS & GYNECOLOGY

## 2025-05-03 PROCEDURE — 700102 HCHG RX REV CODE 250 W/ 637 OVERRIDE(OP): Performed by: OBSTETRICS & GYNECOLOGY

## 2025-05-03 PROCEDURE — 59409 OBSTETRICAL CARE: CPT

## 2025-05-03 PROCEDURE — 700105 HCHG RX REV CODE 258

## 2025-05-03 PROCEDURE — A9270 NON-COVERED ITEM OR SERVICE: HCPCS

## 2025-05-03 PROCEDURE — 700111 HCHG RX REV CODE 636 W/ 250 OVERRIDE (IP)

## 2025-05-03 PROCEDURE — 86850 RBC ANTIBODY SCREEN: CPT

## 2025-05-03 PROCEDURE — 85025 COMPLETE CBC W/AUTO DIFF WBC: CPT

## 2025-05-03 PROCEDURE — 36415 COLL VENOUS BLD VENIPUNCTURE: CPT

## 2025-05-03 PROCEDURE — 86900 BLOOD TYPING SEROLOGIC ABO: CPT

## 2025-05-03 RX ORDER — MISOPROSTOL 200 UG/1
400 TABLET ORAL ONCE
Status: COMPLETED | OUTPATIENT
Start: 2025-05-03 | End: 2025-05-03

## 2025-05-03 RX ORDER — NIFEDIPINE 10 MG/1
10 CAPSULE ORAL
Status: DISCONTINUED | OUTPATIENT
Start: 2025-05-03 | End: 2025-05-03 | Stop reason: HOSPADM

## 2025-05-03 RX ORDER — CALCIUM CARBONATE 500 MG/1
1000 TABLET, CHEWABLE ORAL EVERY 6 HOURS PRN
Status: DISCONTINUED | OUTPATIENT
Start: 2025-05-03 | End: 2025-05-05 | Stop reason: HOSPADM

## 2025-05-03 RX ORDER — TRANEXAMIC ACID 100 MG/ML
1000 INJECTION, SOLUTION INTRAVENOUS ONCE
Status: COMPLETED | OUTPATIENT
Start: 2025-05-03 | End: 2025-05-03

## 2025-05-03 RX ORDER — ACETAMINOPHEN 500 MG
1000 TABLET ORAL EVERY 6 HOURS PRN
Status: DISCONTINUED | OUTPATIENT
Start: 2025-05-03 | End: 2025-05-05 | Stop reason: HOSPADM

## 2025-05-03 RX ORDER — DOCUSATE SODIUM 100 MG/1
100 CAPSULE, LIQUID FILLED ORAL 2 TIMES DAILY PRN
Status: DISCONTINUED | OUTPATIENT
Start: 2025-05-03 | End: 2025-05-05 | Stop reason: HOSPADM

## 2025-05-03 RX ORDER — SODIUM CHLORIDE 9 MG/ML
INJECTION, SOLUTION INTRAVENOUS
Status: COMPLETED
Start: 2025-05-03 | End: 2025-05-03

## 2025-05-03 RX ORDER — MISOPROSTOL 200 UG/1
TABLET ORAL
Status: COMPLETED
Start: 2025-05-03 | End: 2025-05-03

## 2025-05-03 RX ORDER — VITAMIN A ACETATE, BETA CAROTENE, ASCORBIC ACID, CHOLECALCIFEROL, .ALPHA.-TOCOPHEROL ACETATE, DL-, THIAMINE MONONITRATE, RIBOFLAVIN, NIACINAMIDE, PYRIDOXINE HYDROCHLORIDE, FOLIC ACID, CYANOCOBALAMIN, CALCIUM CARBONATE, FERROUS FUMARATE, ZINC OXIDE, CUPRIC OXIDE 3080; 12; 120; 400; 1; 1.84; 3; 20; 22; 920; 25; 200; 27; 10; 2 [IU]/1; UG/1; MG/1; [IU]/1; MG/1; MG/1; MG/1; MG/1; MG/1; [IU]/1; MG/1; MG/1; MG/1; MG/1; MG/1
1 TABLET, FILM COATED ORAL
Status: DISCONTINUED | OUTPATIENT
Start: 2025-05-03 | End: 2025-05-05 | Stop reason: HOSPADM

## 2025-05-03 RX ORDER — LABETALOL HYDROCHLORIDE 5 MG/ML
20-80 INJECTION, SOLUTION INTRAVENOUS
Status: DISCONTINUED | OUTPATIENT
Start: 2025-05-03 | End: 2025-05-03 | Stop reason: HOSPADM

## 2025-05-03 RX ORDER — IBUPROFEN 800 MG/1
800 TABLET, FILM COATED ORAL EVERY 8 HOURS PRN
Status: DISCONTINUED | OUTPATIENT
Start: 2025-05-03 | End: 2025-05-05 | Stop reason: HOSPADM

## 2025-05-03 RX ORDER — FERROUS SULFATE 325(65) MG
325 TABLET ORAL
Status: DISCONTINUED | OUTPATIENT
Start: 2025-05-04 | End: 2025-05-05 | Stop reason: HOSPADM

## 2025-05-03 RX ORDER — TRANEXAMIC ACID 100 MG/ML
INJECTION, SOLUTION INTRAVENOUS
Status: COMPLETED
Start: 2025-05-03 | End: 2025-05-03

## 2025-05-03 RX ORDER — SODIUM CHLORIDE, SODIUM LACTATE, POTASSIUM CHLORIDE, CALCIUM CHLORIDE 600; 310; 30; 20 MG/100ML; MG/100ML; MG/100ML; MG/100ML
2000 INJECTION, SOLUTION INTRAVENOUS PRN
Status: DISCONTINUED | OUTPATIENT
Start: 2025-05-03 | End: 2025-05-05 | Stop reason: HOSPADM

## 2025-05-03 RX ORDER — HYDRALAZINE HYDROCHLORIDE 20 MG/ML
5-10 INJECTION INTRAMUSCULAR; INTRAVENOUS
Status: DISCONTINUED | OUTPATIENT
Start: 2025-05-03 | End: 2025-05-03 | Stop reason: HOSPADM

## 2025-05-03 RX ORDER — MISOPROSTOL 200 UG/1
600 TABLET ORAL
Status: COMPLETED | OUTPATIENT
Start: 2025-05-03 | End: 2025-05-03

## 2025-05-03 RX ORDER — SIMETHICONE 125 MG
125 TABLET,CHEWABLE ORAL 4 TIMES DAILY PRN
Status: DISCONTINUED | OUTPATIENT
Start: 2025-05-03 | End: 2025-05-05 | Stop reason: HOSPADM

## 2025-05-03 RX ORDER — POLYETHYLENE GLYCOL 3350 17 G/17G
1 POWDER, FOR SOLUTION ORAL DAILY
Status: DISCONTINUED | OUTPATIENT
Start: 2025-05-03 | End: 2025-05-05 | Stop reason: HOSPADM

## 2025-05-03 RX ADMIN — NIFEDIPINE 10 MG: 10 CAPSULE ORAL at 05:13

## 2025-05-03 RX ADMIN — IBUPROFEN 800 MG: 800 TABLET, FILM COATED ORAL at 08:53

## 2025-05-03 RX ADMIN — ACETAMINOPHEN 1000 MG: 500 TABLET ORAL at 19:11

## 2025-05-03 RX ADMIN — OXYTOCIN 125 ML/HR: 10 INJECTION, SOLUTION INTRAMUSCULAR; INTRAVENOUS at 07:38

## 2025-05-03 RX ADMIN — FENTANYL CITRATE 100 MCG: 50 INJECTION, SOLUTION INTRAMUSCULAR; INTRAVENOUS at 03:01

## 2025-05-03 RX ADMIN — SODIUM CHLORIDE, POTASSIUM CHLORIDE, SODIUM LACTATE AND CALCIUM CHLORIDE 500 ML: 600; 310; 30; 20 INJECTION, SOLUTION INTRAVENOUS at 09:00

## 2025-05-03 RX ADMIN — PRENATAL WITH FERROUS FUM AND FOLIC ACID 1 TABLET: 3080; 920; 120; 400; 22; 1.84; 3; 20; 10; 1; 12; 200; 27; 25; 2 TABLET ORAL at 16:51

## 2025-05-03 RX ADMIN — MISOPROSTOL 400 MCG: 200 TABLET ORAL at 07:17

## 2025-05-03 RX ADMIN — OXYTOCIN 333 MILLI-UNITS/MIN: 10 INJECTION, SOLUTION INTRAMUSCULAR; INTRAVENOUS at 07:08

## 2025-05-03 RX ADMIN — OXYTOCIN 20 UNITS: 10 INJECTION, SOLUTION INTRAMUSCULAR; INTRAVENOUS at 07:12

## 2025-05-03 RX ADMIN — FENTANYL CITRATE 100 MCG: 50 INJECTION, SOLUTION INTRAMUSCULAR; INTRAVENOUS at 04:43

## 2025-05-03 RX ADMIN — FENTANYL CITRATE 100 MCG: 50 INJECTION, SOLUTION INTRAMUSCULAR; INTRAVENOUS at 06:09

## 2025-05-03 RX ADMIN — FENTANYL CITRATE 100 MCG: 50 INJECTION, SOLUTION INTRAMUSCULAR; INTRAVENOUS at 07:19

## 2025-05-03 RX ADMIN — ACETAMINOPHEN 1000 MG: 500 TABLET ORAL at 13:17

## 2025-05-03 RX ADMIN — NIFEDIPINE 10 MG: 10 CAPSULE ORAL at 04:43

## 2025-05-03 RX ADMIN — TRANEXAMIC ACID 1000 MG: 100 INJECTION, SOLUTION INTRAVENOUS at 07:22

## 2025-05-03 RX ADMIN — MISOPROSTOL 600 MCG: 200 TABLET ORAL at 09:33

## 2025-05-03 RX ADMIN — SODIUM CHLORIDE: 900 INJECTION INTRAVENOUS at 07:15

## 2025-05-03 RX ADMIN — TRANEXAMIC ACID 1000 MG: 100 INJECTION INTRAVENOUS at 07:22

## 2025-05-03 RX ADMIN — IBUPROFEN 800 MG: 800 TABLET, FILM COATED ORAL at 16:45

## 2025-05-03 ASSESSMENT — PAIN DESCRIPTION - PAIN TYPE
TYPE: ACUTE PAIN

## 2025-05-03 NOTE — PROGRESS NOTES
1130) Report received from TYRELL Campbell RN.  Pt resting, complains of being tired, denies ringing of ears or dizziness.  POC discussed  1230) H&H results reviewed with Dr Campbell, orders received to transfer to PP.  Iron ordered.  POC to transfer and allow pt to rest, if hypovolemia symptoms return MD to discuss possible blood transfusion.   1250) Pt up to bathroom, able to void, FF lochia light.  Pt denies dizziness or ringing in ears.  Tylenol given for vaginal/uterine pain.   1325) Transferred to PP; report to Darlin FLETCHER

## 2025-05-03 NOTE — PROGRESS NOTES
"S: Pt reports feeling more uncomfortable with UCs. Denies HA, RUQ/epigastric pain, visual changes.      O: BP (!) 150/100 Comment: manual  Pulse 73   Temp 36.4 °C (97.6 °F) (Oral)   Resp 16   Ht 1.575 m (5' 2\")   Wt 78 kg (172 lb)   BMI 31.46 kg/m²            FHTs:  Baseline 115, variability: moderate, accels: absent, decels:early        [place]: Contractions q 3-5        SVE: deferred     A/P:    1.  IUP @ 39w6d  2.  Cat 1 FHTs    3.  Preeclampsia without severe features - Dr. Lopez consulted, reviewed BPs and labs with MD. Recommended antihypertensive protocol at this time. Discussed with patient who agrees.   4.  Anticipate      Zahira Pinon CNM    "

## 2025-05-03 NOTE — CARE PLAN
The patient is Watcher - Medium risk of patient condition declining or worsening    Shift Goals  Clinical Goals: monitor BP, Hgb; pain management, firm fundus, light lochia  Patient Goals: rest, updates on baby  Family Goals: support, updates    Progress made toward(s) clinical / shift goals:    Problem: Knowledge Deficit - Postpartum  Goal: Patient will verbalize and demonstrate understanding of self and infant care  Outcome: Progressing     Problem: Altered Physiologic Condition  Goal: Patient physiologically stable as evidenced by normal lochia, palpable uterine involution and vitals within normal limits  Outcome: Progressing     Problem: Pain - Standard  Goal: Alleviation of pain or a reduction in pain to the patient’s comfort goal  Outcome: Progressing       Patient is not progressing towards the following goals:

## 2025-05-03 NOTE — PROGRESS NOTES
"S: Patient reports UCs are getting stronger, feeling some pressure. FOB providing labor support. Plan for SVE and AROM if appropriate, pt agrees.      O: BP (!) 158/100 Comment: manual  Pulse 73   Temp 36.4 °C (97.6 °F) (Oral)   Resp 16   Ht 1.575 m (5' 2\")   Wt 78 kg (172 lb)   BMI 31.46 kg/m²            FHTs:  Baseline 140, variability: moderate, accels: present, decels:absent        [place]: Contractions q 2-6m        SVE: 7/70/-2        AROM with moderate amount of clear fluid return      A/P:    1.  IUP @ 39w6d  2.  Cat 1 FHTs    3.  Preeclampsia - pt has received two doses of Procardia IR, BP WNL at this time  4.  IOL - continue pitocin, titrate per protocol   5.  Anticipate     Zahira Pinon CNM    "

## 2025-05-03 NOTE — H&P
History and Physical    Tierra Horn is a 29 y.o. female who presents for induction of labor. She was intending to delivery at home with CPM Adela Campbell but was found to have a BP of 140/80. She had PIH labs completed due to protein in urine at her visit and had a protein creatine ratio of 478. The remainder of her labs were WNL but she was sent for IOL for concern of developing preeclampsia and no longer being appropriate for care in home birth setting.   -Para:     Gestational Age:  39w5d  Admitted for:   Induction of Labor  Admitted to  [facility].  Patient received prenatal care: Adela Campbell CPM    HPI: Patient is admitted with the above mentioned Chief Complaint and States   Loss of fluid:   negative  Abdominal Pain:  negative  Uterine Contractions:  negative  Vaginal Bleeding:  positive  Fetal Movement:  normal  Patient denies fever, chills, nausea, vomiting , headache, visual disturbance, or dysuria  No LMP recorded. Patient is pregnant.  Estimated Date of Delivery: 25  Final MALIK: 2025, by Patient Reported    Patient Active Problem List    Diagnosis Date Noted    Labor and delivery, indication for care 2025    Indication for care in labor or delivery 2025    General counseling and advice on female contraception 2023    PTSD (post-traumatic stress disorder) 2023    Current moderate episode of major depressive disorder (HCC) 2021    EVA (generalized anxiety disorder) 2021    Borderline personality disorder (HCC) 2021    Adjustment disorder with anxious mood 2020    Panic attack 2020    Chlamydia trachomatis infection of genitourinary site 2019       Admitting DX: Labor and delivery, indication for care [O75.9]   Pregnancy Complications:  Elevated BP with diagnosis of hypertension, elevated protein creatine ratio    OB Risk Factors:   None   Labor State:    Not in labor.    History:   has a  "past medical history of Anxiety, Depression, and IUD (intrauterine device) in place (2021).     has a past surgical history that includes hernia repair (N/A).    OB History    Para Term  AB Living   3 0 0  0 2   SAB IAB Ectopic Molar Multiple Live Births    0    0      # Outcome Date GA Lbr Nolan/2nd Weight Sex Type Anes PTL Lv   3 Current            2             1                 Medications:  No current facility-administered medications on file prior to encounter.     Current Outpatient Medications on File Prior to Encounter   Medication Sig Dispense Refill    Prenatal Vit w/Hc-Befhehnsa-SI (PNV PO) Take  by mouth.      Ferrous Gluconate (IRON 27 PO) Take  by mouth.      calcium carbonate (TUMS) 500 MG Chew Tab Chew 500 mg every day.      acetaminophen (TYLENOL) 500 MG Tab Take 1-2 Tablets by mouth every 6 hours as needed for Mild Pain or Moderate Pain. 30 Tablet 0       Allergies:  Patient has no known allergies.    ROS:   Neuro: negative    Cardiovascular: negative  Gastro intestinal: negative  Genitourinary: negative            Physical Exam:  /74   Pulse 85   Temp 36.2 °C (97.2 °F) (Temporal)   Resp 16   Ht 1.575 m (5' 2\")   Wt 78 kg (172 lb)   BMI 31.46 kg/m²   Constitutional: healthy-appearing, Well-developed, well-nourished, in no acute distress  No JVD: at 45 degrees  HEENT: EOMI  Breast Exam: negative  Cardio: regular rate and rhythm, S1, S2 normal, no murmur, click, rub or gallop  Lung: unlabored respirations, no intercostal retractions or accessory muscle use, clear to auscultation without rales or wheezes  Abdomen: abdomen is soft without significant tenderness, masses, organomegaly or guarding  Extremity: extremities, peripheral pulses and reflexes normal, feet normal, good pulses, normal color, temperature and sensation    Cervical Exam: 60%  Cervix Dilatation: 4  Station: negative 2  Pelvis: Adequate  Fetal Assessment: Fetal heart variability: " moderate  Fetal Heart Rate decelerations: none  Fetal Heart Rate accelerations: yes  Baseline FHR: 135 per minute  Uterine contractions: irregular, every 11 minutes  Estimated Fetal Weight: 3000 - 3500g      Labs:  Recent Labs     05/02/25 2035   WBC 8.8   RBC 4.07*   HEMOGLOBIN 11.0*   HEMATOCRIT 34.2*   MCV 84.0   MCH 27.0   MCHC 32.2   RDW 47.1   PLATELETCT 271   MPV 10.4     Prenatal Results       General (Most Recent Result)       Test Value Date Time    ABO  B  02/22/22 1540    Rh  POS  02/22/22 1540    Antibody screen  NEG  02/22/22 1540    HbA1c       Chlamydia by PCR  Negative  04/23/25 1027    Gonorrhea by PCR  Negative  02/22/22 1427    RPR/Syphilus  Non-Reactive  05/02/25 2035    HSV 1/2 by PCR (non-serum)       HSV 1/2 (serum)  0.81 IV 04/23/25 1027    HSV 1       HSV 2       HPV (16)       HBsAg  Non-Reactive  02/22/22 1540    HIV-1 HIV-2 Antibodies  Non-Reactive  04/23/25 1027    Rubella  113.00 IU/mL 05/02/25 2035    Tb                 Pap Smear (Most Recent Result)       Test Value Date Time    Pap smear       Pap smear w/HPV       Pap smear w/CTNG       Pap smar w/HPV CTNG       Pap smear (reflex HPV ACUS)       Pap smear (reflex HPV ASCUS w/CTNG)       Pathology gyn specimen  (See Report)   02/22/22 1427              Urinalysis (Most Recent Result)       Test Value Date Time    Urinalysis  Abnormal   (See Report)   07/14/22 1412    POC urinalysis       Urine drug screen (w/ conf)       Urine culture ([id number])  (See Report)   02/22/22 1540    Urine Protein/Creatinine Ratio  317 mg/g 04/21/25 1300              Urinalysis, Culture if indicated       Test Value Date Time    Color  Yellow  07/14/22 1412    Appearance  Cloudy  07/14/22 1412    Specific Gravity  1.023  07/14/22 1412    PH  6.0  07/14/22 1412    Glucose  100 mg/dL 07/14/22 1412    Ketones  Negative mg/dL 07/14/22 1412    Protein  Negative mg/dL 07/14/22 1412    Bilirubin  Negative  04/21/25 1218    Nitrites  Negative  07/14/22 1412     Leukocytes Esterase  Moderate  07/14/22 1412    Blood  Negative  07/14/22 1412    Comment  Microscopic  07/14/22 1412    Culture                 Urine Drug Screen       Test Value Date Time    Amphetamines       Barbiturates       Benzodiazepines       Cocaine       Methadone       Opiates       Oxycodone       Phencylidine       Propoxyphene       Marijuana Metabolite                 1st Trimester       Test Value Date Time    Hgb       Hct       Fasting Glucose Tolerance       GTT, 1 hour       GTT, 2 hours       GTT, 3 hours                 2nd Trimester       Test Value Date Time    Hgb       Hct       AST       ALT       Uric Acid       Fasting Glucose Tolerance       GTT, 1 hour       GTT, 2 hours       GTT, 3 hours                 3rd Trimester       Test Value Date Time    Hgb  11.0 g/dL 05/02/25 2035       10.4 g/dL 04/21/25 1300    Hct  34.2 % 05/02/25 2035       32.6 % 04/21/25 1300    Platelet count  271 K/uL 05/02/25 2035       265 K/uL 04/21/25 1300    GBS (EL BROTH)       Fasting Glucose Tolerance       GTT, 1 hour       GTT, 2 hous       GTT, 3hours                 Congenital Disease Screening       Test Value Date Time    First Trimester Screen       Quad Screen       BH Electrophoresis       Cystic Fibrosis Carrier Study       SMA       AFP Maternal Serum        AFP Tetra       NIPT                 Legend    ^: Historical                              Assessment:  Gestational Age:  39w5d  Labor State:   Not in labor   Risk Factors:   none  Pregnancy Complications: Elevated BP with diagnosis of hypertension, elevated protein creatine ratio      Patient Active Problem List    Diagnosis Date Noted    Labor and delivery, indication for care 05/02/2025    Indication for care in labor or delivery 04/25/2025    General counseling and advice on female contraception 03/23/2023    PTSD (post-traumatic stress disorder) 03/23/2023    Current moderate episode of major depressive disorder (HCC) 11/30/2021    EVA  (generalized anxiety disorder) 2021    Borderline personality disorder (HCC) 2021    Adjustment disorder with anxious mood 2020    Panic attack 2020    Chlamydia trachomatis infection of genitourinary site 2019       Plan:   Admitted for:  Induction of labor     CBC, CMP, T. Pallidum and hold   Routine urinalysis, protein creatine ration   Antibiotics: Not indicated at this time   Epidural as desired     AMELIA Becerra.

## 2025-05-03 NOTE — PROGRESS NOTES
29 F G 3 P 2 39 w 5 d arrives to labor and delivery for induction of labor. EFM and TOCO applied.      SVE see flowsheets     report given to Jake HIDALGO     Jake MELINDA bedside to discuss POC, orders placed to start pitocin  and take BP every 1 hr    2357 pt called out stating she was feeling hot, took oral temp 97.6 f. Offered cold towel and lowered air.    0022 pt feels much improved, will continue to monitoring temp    0045 SVE see flowsheets, CARITO placed    0125 BP reading on monitor elevated, taken manually at 148/98. Jake LAWRENCE updated, orders to retake 10 min. If elevated take BP every 30 min.    0415 2 severe range pressures taken, see flowsheets. Jake LAWRENCE updated    0430 orders received to start hypertensive protocol    0433 1st dose of nifedipine, see MAR    05 repeat BP still elevated 158/100, 2nd dose of nifedipine given    0547 Jake MELINDA bedside for SVE and AROM, see flowsheets    0631 Jake MELINDA bedside for SVE, see flowsheets    0657  of a viable female, APGARS 8/9    0705 report given to Tawana FLETCHER

## 2025-05-03 NOTE — L&D DELIVERY NOTE
Delivery Note for Vaginal Delivery     Stage 1: AGE@ y/o  at 39w6d weeks admitted for induction of labor. Her pregnancy has been complicated by preeclampsia. She required oral antihypertensive therapy while in labor.  Her labor progressed with pitocin and AROM. The patient was noted to be GBS negative and did not require antibiotics in labor. Fetal monitoring during labor was overall category I.  Patient had IV pain medication for pain control.     Stage II: At 655, she was noted to be complete. She then pushed for 2 minutes to deliver a  viable, vigorous female infant on 5/3/25 at 0657. Shoulders were delivered easily. The infant was placed immediately upon mother’s abdomen. Cord gases were not collected and Apgars at 1 and 5 minutes were 8/9. The infant has not yet been weighed.    Stage III: Attention was then turned to active management of the third stage. The placenta was delivered spontaneously with gentle manual traction on the umbilical cord with countertraction maintained suprapubically. On inspection, the placenta was intact and had normal insertion. Upon delivery of the placenta, good hemostasis was not noted with fundal massage and a 40 unit bolus of pitocin in IV infusion was administered per protocol, along with 400mcg of cytotec and 1g ot tranexamic acid. After fundal massage, firm fundal tone was noted and hemostasis was achieved.       Laceration repair: The perineum was inspected following delivery. The patient did not have any lacerations.    Estimated blood loss for the above procedures was 700cc.     Mother and infant in stable condition, and family pleased with birth.     Delivery Complications: postpartum hemorrhage     AMELIA Becerra.   Dr. Lopez is attending

## 2025-05-03 NOTE — PROGRESS NOTES
0705 - Report received from CHANCE Martini. See MAR for medication administration for blood loss.  0930 - Pt up to bathroom, voided adequately. Pericare done. Pt back to bed and c/o lightheadedness, dizziness. See flowsheet for VS. Dr Campbell notified, orders received.  1115 - Pt c/o ringing in ears and fatigue. Dr Campbell notified, orders received.  1130 - Report to CHANCE Lake.

## 2025-05-03 NOTE — CARE PLAN
The patient is Watcher - Medium risk of patient condition declining or worsening    Shift Goals  Clinical Goals: make cervical change and manage pain  Patient Goals: healthy mom and baby  Family Goals: support    Progress made toward(s) clinical / shift goals:  progressing    Problem: Risk for Injury  Goal: Patient and fetus will be free of preventable injury/complications  Outcome: Progressing  Note: Monitoring BP levels, continue fetal monitoring     Problem: Pain  Goal: Patient's pain will be alleviated or reduced to the patient’s comfort goal  Outcome: Progressing  Note: Pt desires a natural labor, discussed pain options of epidural or IV pain medication

## 2025-05-04 PROBLEM — O14.93 PRE-ECLAMPSIA IN THIRD TRIMESTER: Status: ACTIVE | Noted: 2025-05-04

## 2025-05-04 PROBLEM — D62 ACUTE BLOOD LOSS ANEMIA: Status: ACTIVE | Noted: 2025-05-04

## 2025-05-04 LAB
ERYTHROCYTE [DISTWIDTH] IN BLOOD BY AUTOMATED COUNT: 49.3 FL (ref 35.9–50)
ERYTHROCYTE [DISTWIDTH] IN BLOOD BY AUTOMATED COUNT: 50.4 FL (ref 35.9–50)
HCT VFR BLD AUTO: 23.4 % (ref 37–47)
HCT VFR BLD AUTO: 25.2 % (ref 37–47)
HGB BLD-MCNC: 7.4 G/DL (ref 12–16)
HGB BLD-MCNC: 8 G/DL (ref 12–16)
MCH RBC QN AUTO: 27.3 PG (ref 27–33)
MCH RBC QN AUTO: 27.7 PG (ref 27–33)
MCHC RBC AUTO-ENTMCNC: 31.6 G/DL (ref 32.2–35.5)
MCHC RBC AUTO-ENTMCNC: 31.7 G/DL (ref 32.2–35.5)
MCV RBC AUTO: 86.3 FL (ref 81.4–97.8)
MCV RBC AUTO: 87.2 FL (ref 81.4–97.8)
PLATELET # BLD AUTO: 208 K/UL (ref 164–446)
PLATELET # BLD AUTO: 244 K/UL (ref 164–446)
PMV BLD AUTO: 10.2 FL (ref 9–12.9)
PMV BLD AUTO: 10.4 FL (ref 9–12.9)
RBC # BLD AUTO: 2.71 M/UL (ref 4.2–5.4)
RBC # BLD AUTO: 2.89 M/UL (ref 4.2–5.4)
WBC # BLD AUTO: 9.7 K/UL (ref 4.8–10.8)
WBC # BLD AUTO: 9.8 K/UL (ref 4.8–10.8)

## 2025-05-04 PROCEDURE — A9270 NON-COVERED ITEM OR SERVICE: HCPCS

## 2025-05-04 PROCEDURE — 85027 COMPLETE CBC AUTOMATED: CPT | Mod: 91

## 2025-05-04 PROCEDURE — A9270 NON-COVERED ITEM OR SERVICE: HCPCS | Performed by: OBSTETRICS & GYNECOLOGY

## 2025-05-04 PROCEDURE — 36415 COLL VENOUS BLD VENIPUNCTURE: CPT

## 2025-05-04 PROCEDURE — 700102 HCHG RX REV CODE 250 W/ 637 OVERRIDE(OP): Performed by: OBSTETRICS & GYNECOLOGY

## 2025-05-04 PROCEDURE — 700102 HCHG RX REV CODE 250 W/ 637 OVERRIDE(OP)

## 2025-05-04 PROCEDURE — 770002 HCHG ROOM/CARE - OB PRIVATE (112)

## 2025-05-04 RX ADMIN — IBUPROFEN 800 MG: 800 TABLET, FILM COATED ORAL at 00:54

## 2025-05-04 RX ADMIN — FERROUS SULFATE TAB 325 MG (65 MG ELEMENTAL FE) 325 MG: 325 (65 FE) TAB at 08:31

## 2025-05-04 RX ADMIN — IBUPROFEN 800 MG: 800 TABLET, FILM COATED ORAL at 17:47

## 2025-05-04 RX ADMIN — PRENATAL WITH FERROUS FUM AND FOLIC ACID 1 TABLET: 3080; 920; 120; 400; 22; 1.84; 3; 20; 10; 1; 12; 200; 27; 25; 2 TABLET ORAL at 08:31

## 2025-05-04 RX ADMIN — ACETAMINOPHEN 1000 MG: 500 TABLET ORAL at 05:44

## 2025-05-04 RX ADMIN — IBUPROFEN 800 MG: 800 TABLET, FILM COATED ORAL at 08:31

## 2025-05-04 RX ADMIN — ACETAMINOPHEN 1000 MG: 500 TABLET ORAL at 20:02

## 2025-05-04 ASSESSMENT — PAIN DESCRIPTION - PAIN TYPE
TYPE: ACUTE PAIN

## 2025-05-04 NOTE — DISCHARGE PLANNING
:    Assessment attempted, patient left to be with baby for Echo. LMSW to attempt an assessment later.     Addendum @ 1400: LMSW attempted to meet with the patient at bedside to complete an assessment. Patient was not present. LMSW to round again later to complete assessment.

## 2025-05-04 NOTE — PROGRESS NOTES
1322- Patient arrived to Room S310.  1345- Report received from JEAN Lake RN.  Patient assessment done.  IV saline locked.  Discussed pain management with patient, to include MD orders for prn pain medication, as well as the use of heat packs as an additional comfort measure.  Patient oriented to room and call system.  Reviewed plan of care.  Patient verbalized understanding.  9475- Report given to CHANCE Hernandez, who assumed care of patient.

## 2025-05-04 NOTE — CARE PLAN
Problem: Infection - Postpartum  Goal: Postpartum patient will be free of signs and symptoms of infection  Outcome: Progressing   The patient is Stable - Low risk of patient condition declining or worsening    Shift Goals  Clinical Goals: VSS, firm fundus, light lochia  Patient Goals: rest  Family Goals: remain up to date on NB POC    Progress made toward(s) clinical / shift goals:  Pt temp within defined parameters. Pt CBC redraw scheduled for the am. Pt not currently exhibiting signs/symptoms related to infection at this time. Pt to continue to be monitored.

## 2025-05-04 NOTE — PROGRESS NOTES
Assessment completed. Fundus firm, lochia scant. Plan of care reviewed with MOB, verbalized understanding. Denies pain at this time, will call if pain medication needed.

## 2025-05-04 NOTE — PROGRESS NOTES
5931 Received report from CHANCE ERIC  5480 Pt assessment completed. No abnormal findings noted. All pt needs and questions addressed at this time.

## 2025-05-04 NOTE — ASSESSMENT & PLAN NOTE
Without severe features.  on admission. Received nifedipine IR 10 mg x2 in labor.   Elevations to 140s/90s evening of 5/3, normotensive since. Asymptomatic. Not on any antihypertensives.  - Continue monitoring BP

## 2025-05-04 NOTE — LACTATION NOTE
Initial Lactation Consultation:    Met with Tierra to assist with pump initiation, as karma Hurley is in the  nursery receiving oxygen support. Tierra reports that baby was latching to the breast well prior to her transfer to the nursery.    Pump setup performed (equipment and educational literature reviewed, and left at bedside), but Tierra is experiencing uncomfortable uterine cramps at this time, and requests delay in pump start. She agrees to call for lactation/nursing assistance when she feels well enough to begin pumping.     Lactation Plan:    Begin breast pumping, and pump breasts with hospital-grade, double electric pump every three hours, until baby Mei returns to breast.  Once infant resumes breastfeeding, perform cue-based breastfeeding, at least once every three hours, for a total of 8+ feeds per 24 hours.

## 2025-05-04 NOTE — ASSESSMENT & PLAN NOTE
PPD 1  complicated by PPH.  - Post care: meeting all goals  - Pain: controlled   - Rh+, Rubella Immune  - Method of Feeding: plans to breastfeed  - Method of Contraception: Declines at this time, desires Paragard to be arranged at postpartum visit. Discussed available options in hospital/at discharge including POP, Nexplanon, Depo-provera. Counseled on potential for early return of fertility, potential for pregnancy even if menses has not returned, and even if exclusively breastfeeding.

## 2025-05-04 NOTE — PROGRESS NOTES
Obstetrics & Gynecology Post-Delivery Progress Note    Date of Service  25    29 y.o.  s/p vaginal, spontaneous delivery  Delivery date: 5/3/2025    Pregnancy was complicated by preeclampsia without severe features for which she received nifedipine IR 10 mg x2 in labor.     Delivery complicated by postpartum hemorrhage with  mL treated with Pitocin, tranexamic acid, Cytotec.     Baby remains in the nursery for oxygen support.    Subjective  Pt reports she is feeling much better today than yesterday. Her energy is improving, she is able to eat normally without nausea, and has been walking back and forth to the nursery without any lightheadedness, dizziness, heart racing/palpitations.  Pain: Yes,  controlled  Bleeding: lochia less than regular menstrual bleeding  Tolerating PO: Yes  Voiding: without difficulty  Ambulating: yes  Passing flatus: Yes  Feeding: breastfeeding and pumping    Objective  24hr VS:  Temp:  [36.4 °C (97.5 °F)-37.2 °C (98.9 °F)] 36.6 °C (97.9 °F)  Pulse:  [] 73  Resp:  [16-18] 17  BP: (110-147)/(56-96) 128/88  SpO2:  [95 %-99 %] 97 %    Physical Exam  Gen: well and resting  CV: RRR, nl S1 and S2  Resp: unlabored respirations, no intercostal retractions or accessory muscle use, clear to auscultation without rales or wheezes  Chest/Breasts: exam deferred  Abd: soft, small area of bruising noted at umbilicus that is tender to palpation, otherwise nontender abdomen.  Fundus: firm, below umbilicus, and nontender  not applicable, (vaginal delivery)  Perineum: deferred  Ext: symmetric and no edema, calves nontender    Labs:  Recent Results (from the past 48 hours)   HOLD BLOOD BANK SPECIMEN (NOT TESTED)    Collection Time: 25  8:35 PM   Result Value Ref Range    Holding Tube - Bb DONE    CBC with differential    Collection Time: 25  8:35 PM   Result Value Ref Range    WBC 8.8 4.8 - 10.8 K/uL    RBC 4.07 (L) 4.20 - 5.40 M/uL    Hemoglobin 11.0 (L) 12.0 - 16.0 g/dL     Hematocrit 34.2 (L) 37.0 - 47.0 %    MCV 84.0 81.4 - 97.8 fL    MCH 27.0 27.0 - 33.0 pg    MCHC 32.2 32.2 - 35.5 g/dL    RDW 47.1 35.9 - 50.0 fL    Platelet Count 271 164 - 446 K/uL    MPV 10.4 9.0 - 12.9 fL    Neutrophils-Polys 64.20 44.00 - 72.00 %    Lymphocytes 25.70 22.00 - 41.00 %    Monocytes 8.30 0.00 - 13.40 %    Eosinophils 0.50 0.00 - 6.90 %    Basophils 0.50 0.00 - 1.80 %    Immature Granulocytes 0.80 0.00 - 0.90 %    Nucleated RBC 0.20 0.00 - 0.20 /100 WBC    Neutrophils (Absolute) 5.68 1.82 - 7.42 K/uL    Lymphs (Absolute) 2.27 1.00 - 4.80 K/uL    Monos (Absolute) 0.73 0.00 - 0.85 K/uL    Eos (Absolute) 0.04 0.00 - 0.51 K/uL    Baso (Absolute) 0.04 0.00 - 0.12 K/uL    Immature Granulocytes (abs) 0.07 0.00 - 0.11 K/uL    NRBC (Absolute) 0.02 K/uL   T.PALLIDUM AB TOMAS (Syphilis)    Collection Time: 05/02/25  8:35 PM   Result Value Ref Range    Syphilis, Treponemal Qual Non-Reactive Non-Reactive   Comp Metabolic Panel    Collection Time: 05/02/25  8:35 PM   Result Value Ref Range    Sodium 137 135 - 145 mmol/L    Potassium 3.8 3.6 - 5.5 mmol/L    Chloride 106 96 - 112 mmol/L    Co2 18 (L) 20 - 33 mmol/L    Anion Gap 13.0 7.0 - 16.0    Glucose 103 (H) 65 - 99 mg/dL    Bun 10 8 - 22 mg/dL    Creatinine 0.79 0.50 - 1.40 mg/dL    Calcium 8.9 8.5 - 10.5 mg/dL    Correct Calcium 9.6 8.5 - 10.5 mg/dL    AST(SGOT) 23 12 - 45 U/L    ALT(SGPT) 8 2 - 50 U/L    Alkaline Phosphatase 134 (H) 30 - 99 U/L    Total Bilirubin <0.2 0.1 - 1.5 mg/dL    Albumin 3.1 (L) 3.2 - 4.9 g/dL    Total Protein 6.4 6.0 - 8.2 g/dL    Globulin 3.3 1.9 - 3.5 g/dL    A-G Ratio 0.9 g/dL   RUBELLA ABS IGG    Collection Time: 05/02/25  8:35 PM   Result Value Ref Range    Rubella IgG Antibody 113.00 IU/mL   ESTIMATED GFR    Collection Time: 05/02/25  8:35 PM   Result Value Ref Range    GFR (CKD-EPI) 103 >60 mL/min/1.73 m 2   PROTEIN/CREAT RATIO URINE    Collection Time: 05/02/25 10:45 PM   Result Value Ref Range    Total Protein, Urine 95.4 (H)  0.0 - 15.0 mg/dL    Creatinine, Random Urine 238.00 mg/dL    Protein Creatinine Ratio 401 (H) 10 - 107 mg/g   CBC WITH DIFFERENTIAL    Collection Time: 05/03/25  7:58 AM   Result Value Ref Range    WBC 13.8 (H) 4.8 - 10.8 K/uL    RBC 3.78 (L) 4.20 - 5.40 M/uL    Hemoglobin 10.2 (L) 12.0 - 16.0 g/dL    Hematocrit 31.4 (L) 37.0 - 47.0 %    MCV 83.1 81.4 - 97.8 fL    MCH 27.0 27.0 - 33.0 pg    MCHC 32.5 32.2 - 35.5 g/dL    RDW 46.9 35.9 - 50.0 fL    Platelet Count 260 164 - 446 K/uL    MPV 10.3 9.0 - 12.9 fL    Neutrophils-Polys 70.50 44.00 - 72.00 %    Lymphocytes 20.70 (L) 22.00 - 41.00 %    Monocytes 6.70 0.00 - 13.40 %    Eosinophils 0.70 0.00 - 6.90 %    Basophils 0.40 0.00 - 1.80 %    Immature Granulocytes 1.00 (H) 0.00 - 0.90 %    Nucleated RBC 0.00 0.00 - 0.20 /100 WBC    Neutrophils (Absolute) 9.72 (H) 1.82 - 7.42 K/uL    Lymphs (Absolute) 2.85 1.00 - 4.80 K/uL    Monos (Absolute) 0.93 (H) 0.00 - 0.85 K/uL    Eos (Absolute) 0.09 0.00 - 0.51 K/uL    Baso (Absolute) 0.06 0.00 - 0.12 K/uL    Immature Granulocytes (abs) 0.14 (H) 0.00 - 0.11 K/uL    NRBC (Absolute) 0.00 K/uL   COD - Adult (Type and Screen)    Collection Time: 05/03/25 11:34 AM   Result Value Ref Range    ABO Grouping Only B     Rh Grouping Only POS     Antibody Screen-Cod NEG    CBC WITHOUT DIFFERENTIAL    Collection Time: 05/03/25 11:34 AM   Result Value Ref Range    WBC 15.4 (H) 4.8 - 10.8 K/uL    RBC 3.13 (L) 4.20 - 5.40 M/uL    Hemoglobin 8.5 (L) 12.0 - 16.0 g/dL    Hematocrit 26.4 (L) 37.0 - 47.0 %    MCV 84.3 81.4 - 97.8 fL    MCH 27.2 27.0 - 33.0 pg    MCHC 32.2 32.2 - 35.5 g/dL    RDW 47.2 35.9 - 50.0 fL    Platelet Count 214 164 - 446 K/uL    MPV 9.9 9.0 - 12.9 fL   CBC without differential- Once in AM regardless of delivery time    Collection Time: 05/04/25  5:16 AM   Result Value Ref Range    WBC 9.7 4.8 - 10.8 K/uL    RBC 2.71 (L) 4.20 - 5.40 M/uL    Hemoglobin 7.4 (L) 12.0 - 16.0 g/dL    Hematocrit 23.4 (L) 37.0 - 47.0 %    MCV 86.3  81.4 - 97.8 fL    MCH 27.3 27.0 - 33.0 pg    MCHC 31.6 (L) 32.2 - 35.5 g/dL    RDW 49.3 35.9 - 50.0 fL    Platelet Count 208 164 - 446 K/uL    MPV 10.4 9.0 - 12.9 fL       Medications  ferrous sulfate, 325 mg, Oral, QDAY with Breakfast  prenatal plus vitamin, 1 Tablet, Oral, Daily-0800  polyethylene glycol/lytes, 1 Packet, Oral, DAILY      PRN medications: LR, docusate sodium, ibuprofen, acetaminophen, tetanus-dipth-acell pertussis, simethicone, calcium carbonate      Assessment/Plan  Tirera Horn is a 29 y.o.  postpartum day # 1 s/p vaginal, spontaneous delivery complicated by postpartum hemorrhage.     Postpartum hemorrhage  #Acute blood loss anemia  Asymptomatic today, no tachycardia or hypotension. Patient feeling much better than yesterday. Hgb 7.4 this morning. Patient is agreeable to blood transfusion if needed, will defer at this time as she is asymptomatic and hemodynamically stable.   - Recheck CBC this afternoon  - Transfuse for Hgb <7.0 or if develops symptoms of anemia    Spontaneous vaginal delivery   PPD 1  complicated by PPH.  - Post care: meeting all goals  - Pain: controlled   - Rh+, Rubella Immune  - Method of Feeding: plans to breastfeed  - Method of Contraception: Declines at this time, desires Paragard to be arranged at postpartum visit. Discussed available options in hospital/at discharge including POP, Nexplanon, Depo-provera. Counseled on potential for early return of fertility, potential for pregnancy even if menses has not returned, and even if exclusively breastfeeding.    Pre-eclampsia in third trimester  Without severe features.  on admission. Received nifedipine IR 10 mg x2 in labor.   Elevations to 140s/90s evening of 5/3, normotensive since. Asymptomatic. Not on any antihypertensives.  - Continue monitoring BP     VTE prophylaxis: patient ambulating    - Disposition: Anticipate discharge home on PPD 2     Tasha Luna D.O.   PGY-1  [facility]

## 2025-05-04 NOTE — ASSESSMENT & PLAN NOTE
#Acute blood loss anemia  Asymptomatic today, no tachycardia or hypotension. Patient feeling much better than yesterday. Hgb 7.4 this morning. Patient is agreeable to blood transfusion if needed, will defer at this time as she is asymptomatic and hemodynamically stable.   - Recheck CBC this afternoon  - Transfuse for Hgb <7.0 or if develops symptoms of anemia

## 2025-05-05 VITALS
HEIGHT: 62 IN | BODY MASS INDEX: 31.65 KG/M2 | WEIGHT: 172 LBS | RESPIRATION RATE: 18 BRPM | OXYGEN SATURATION: 98 % | SYSTOLIC BLOOD PRESSURE: 138 MMHG | HEART RATE: 90 BPM | DIASTOLIC BLOOD PRESSURE: 88 MMHG | TEMPERATURE: 97.2 F

## 2025-05-05 LAB
ERYTHROCYTE [DISTWIDTH] IN BLOOD BY AUTOMATED COUNT: 50.6 FL (ref 35.9–50)
HCT VFR BLD AUTO: 22.6 % (ref 37–47)
HGB BLD-MCNC: 7.2 G/DL (ref 12–16)
MCH RBC QN AUTO: 27.8 PG (ref 27–33)
MCHC RBC AUTO-ENTMCNC: 31.9 G/DL (ref 32.2–35.5)
MCV RBC AUTO: 87.3 FL (ref 81.4–97.8)
PLATELET # BLD AUTO: 209 K/UL (ref 164–446)
PMV BLD AUTO: 10.1 FL (ref 9–12.9)
RBC # BLD AUTO: 2.59 M/UL (ref 4.2–5.4)
WBC # BLD AUTO: 11.2 K/UL (ref 4.8–10.8)

## 2025-05-05 PROCEDURE — 700102 HCHG RX REV CODE 250 W/ 637 OVERRIDE(OP)

## 2025-05-05 PROCEDURE — A9270 NON-COVERED ITEM OR SERVICE: HCPCS | Performed by: OBSTETRICS & GYNECOLOGY

## 2025-05-05 PROCEDURE — 85027 COMPLETE CBC AUTOMATED: CPT

## 2025-05-05 PROCEDURE — 700102 HCHG RX REV CODE 250 W/ 637 OVERRIDE(OP): Performed by: OBSTETRICS & GYNECOLOGY

## 2025-05-05 PROCEDURE — 36415 COLL VENOUS BLD VENIPUNCTURE: CPT

## 2025-05-05 PROCEDURE — A9270 NON-COVERED ITEM OR SERVICE: HCPCS

## 2025-05-05 RX ORDER — VITAMIN A ACETATE, BETA CAROTENE, ASCORBIC ACID, CHOLECALCIFEROL, .ALPHA.-TOCOPHEROL ACETATE, DL-, THIAMINE MONONITRATE, RIBOFLAVIN, NIACINAMIDE, PYRIDOXINE HYDROCHLORIDE, FOLIC ACID, CYANOCOBALAMIN, CALCIUM CARBONATE, FERROUS FUMARATE, ZINC OXIDE, CUPRIC OXIDE 3080; 12; 120; 400; 1; 1.84; 3; 20; 22; 920; 25; 200; 27; 10; 2 [IU]/1; UG/1; MG/1; [IU]/1; MG/1; MG/1; MG/1; MG/1; MG/1; [IU]/1; MG/1; MG/1; MG/1; MG/1; MG/1
1 TABLET, FILM COATED ORAL DAILY
COMMUNITY
Start: 2025-05-05

## 2025-05-05 RX ORDER — POLYETHYLENE GLYCOL 3350 17 G/17G
17 POWDER, FOR SOLUTION ORAL DAILY
COMMUNITY
Start: 2025-05-06

## 2025-05-05 RX ORDER — PSEUDOEPHEDRINE HCL 30 MG
100 TABLET ORAL 2 TIMES DAILY PRN
Qty: 60 CAPSULE | Refills: 0 | Status: SHIPPED | OUTPATIENT
Start: 2025-05-05

## 2025-05-05 RX ORDER — IBUPROFEN 800 MG/1
800 TABLET, FILM COATED ORAL EVERY 8 HOURS PRN
COMMUNITY
Start: 2025-05-05

## 2025-05-05 RX ORDER — FERROUS SULFATE 325(65) MG
325 TABLET ORAL
Qty: 30 TABLET | Refills: 0 | Status: SHIPPED | OUTPATIENT
Start: 2025-05-06

## 2025-05-05 RX ADMIN — PRENATAL WITH FERROUS FUM AND FOLIC ACID 1 TABLET: 3080; 920; 120; 400; 22; 1.84; 3; 20; 10; 1; 12; 200; 27; 25; 2 TABLET ORAL at 07:40

## 2025-05-05 RX ADMIN — ACETAMINOPHEN 1000 MG: 500 TABLET ORAL at 07:40

## 2025-05-05 RX ADMIN — FERROUS SULFATE TAB 325 MG (65 MG ELEMENTAL FE) 325 MG: 325 (65 FE) TAB at 07:40

## 2025-05-05 RX ADMIN — IBUPROFEN 800 MG: 800 TABLET, FILM COATED ORAL at 06:07

## 2025-05-05 ASSESSMENT — EDINBURGH POSTNATAL DEPRESSION SCALE (EPDS)
I HAVE LOOKED FORWARD WITH ENJOYMENT TO THINGS: AS MUCH AS I EVER DID
THE THOUGHT OF HARMING MYSELF HAS OCCURRED TO ME: NEVER
I HAVE BEEN SO UNHAPPY THAT I HAVE HAD DIFFICULTY SLEEPING: YES, SOMETIMES
I HAVE BEEN ABLE TO LAUGH AND SEE THE FUNNY SIDE OF THINGS: AS MUCH AS I ALWAYS COULD
I HAVE FELT SAD OR MISERABLE: NOT VERY OFTEN
I HAVE FELT SCARED OR PANICKY FOR NO GOOD REASON: YES, SOMETIMES
I HAVE BEEN ANXIOUS OR WORRIED FOR NO GOOD REASON: HARDLY EVER
I HAVE BLAMED MYSELF UNNECESSARILY WHEN THINGS WENT WRONG: YES, SOME OF THE TIME
I HAVE BEEN SO UNHAPPY THAT I HAVE BEEN CRYING: NO, NEVER
THINGS HAVE BEEN GETTING ON TOP OF ME: NO, MOST OF THE TIME I HAVE COPED QUITE WELL

## 2025-05-05 ASSESSMENT — PAIN DESCRIPTION - PAIN TYPE
TYPE: ACUTE PAIN
TYPE: ACUTE PAIN

## 2025-05-05 NOTE — LACTATION NOTE
Follow up lactation consultation:    Met with Tierra to provide lactation support following baby Mei's transfer to NICU for apneic episodes. Tierra states that she has not been pumping regularly, as it was easily expressing milk for her. She has been performing some intermittent hand expression, and was able to resume breastfeeding sessions with baby earlier this afternoon.    Pump settings and use reviewed and demonstrated. 21 mm flanges fit appropriately and patient reports comfort @ 30% suction. Pump at speed of 80cpm for 2 min and then turn down to 60cpm for a total of 15min every 2-3hrs. Follow with 2-3 minutes hand expression. Anticipatory guidance provided regarding typical volumes of colostrum expressed in the first 1-3 days    We discussed timing pump sessions approximately 30 minutes after baby's care times in NICU. If it is medically appropriate, she will attempt to latch baby, then pump if unable to achieve optimal latching at breast.    Breast pump rental reviewed. Tierra does not yet have a pump at home.    [facility] referral faxed to [facility].     Tierra is encouraged to request lactation re-consultation as needed, throughout Mei's hospital stay.

## 2025-05-05 NOTE — DISCHARGE INSTRUCTIONS

## 2025-05-05 NOTE — CARE PLAN
The patient is Stable - Low risk of patient condition declining or worsening    Shift Goals  Clinical Goals: vss, lochia WNL, bond  Patient Goals: rest  Family Goals: remain up to date on NB POC      Problem: Psychosocial - Postpartum  Goal: Patient will verbalize and demonstrate effective bonding and parenting behavior  Outcome: Progressing  Note: MOB has shown adequate bonding with infant, provided skin to skin, and proper cares of infant.      Problem: Knowledge Deficit - Postpartum  Goal: Patient will verbalize and demonstrate understanding of self and infant care  Outcome: Progressing  Note: MOB demonstrates and verbalizes understanding on how to care for . Asks appropriate questions and calls for help when necessary. Education has been provided to patient.

## 2025-05-05 NOTE — PROGRESS NOTES
Assumed care of patient, report at bedside from, Liudmila RN. Assessment completed and WDL. Call light within reach, discussed plan of care, denies pain at the moment.

## 2025-05-05 NOTE — PROGRESS NOTES
0700 Assessment completed. Fundus firm, lochia none. Plan of care reviewed with MOB, verbalized understanding.

## 2025-05-05 NOTE — DISCHARGE PLANNING
Discharge Planning Assessment Post Partum    Reason for Referral: History of depression, anxiety, bipolar, PTSD, and borderline personality disorder  Address: [address] [place] [address] [place] [address]  Phone: [phone]  Type of Living Situation: stable housing   Mom Diagnosis: Pregnancy, vaginal delivery   Baby Diagnosis: NICU-39.6 weeks, respiratory distress   Primary Language: English     Name of Baby: Mei Nicole (: 5/[birth year]/25)  Father of the Baby: Jesus Manuel Nicole   Involved in baby’s care?  Yes  Contact Information: [phone]    Prenatal Care: Yes, with Midwife-Adela Campbell.  MOB was planning on having a home birth until recently developing preeclampsia   Mom's PCP: [facility]   PCP for new baby: Pediatrician list provided.  MOB was taking her other children [facility], but the MD they were seeing recently retired.    Support System: FOB  Coping/Bonding between mother & baby: Yes, MOB has been visiting baby in NICU.  Asked MOB how FOB was doing, MOB stated he is doing ok, but wanting them to be home.  MOB stated he was really worried when she was having complications during her pregnancy and now that baby has to stay in the NICU longer, he is just wanting all of them to be ok and home soon.  Source of Feeding: breast feeding   Supplies for Infant: prepared for infant; denies any needs    Mom's Insurance: [place] [employer].  MOB provided updated number: [id number].  Notified PFA of new number.  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  MOB scored a 9 on the EPDS screen.  Discussed with  mother who stated she was on medication in the past, but no longer.  MOB denies any current symptoms.  Provided PPD/PPA resources.  Domestic Violence History: No, discussed with mother who denies any type of abuse or safety issues.  Drug/ETOH History: No    Resources Provided: PPD/PPA handout, children and family community resource list, diaper bank referrals, list of WIC clinics, and a pediatrician list   Referrals Made: diaper bank referrals and referral sent to [facility] to update MOB's Medicaid number     Clearance for Discharge: Infant is cleared to discharge home with parents once medically cleared

## 2025-05-05 NOTE — LACTATION NOTE
This note was copied from a baby's chart.  Manual pump provided by bedside RN. Mom understands that she can pump at baby's bedside with electric pump as well.   WIC referral scanned to [facility] for mom and baby Mei.

## 2025-05-05 NOTE — PROGRESS NOTES
0400-  Received report from Cristina FLETCHER. Assumed care. 12 hour chart check, MAR and orders reviewed.

## 2025-05-05 NOTE — DISCHARGE SUMMARY
[facility]  Obstetrics Discharge Summary    Date of Admission: 2025  Date of Discharge: 25    Admitting diagnosis:    1. Pregnancy at 39w6d    Discharge Diagnosis:   1. Status post vaginal, spontaneous.  2. Pre-ecclampsia  3. Post-partum hemorrhage    Hospital Course:   Pt is 29 y.o. now  who presented on 2025 for induction of labor.   Labor induction performed with pitocin and AROM.   IV pain medications were utilized with good effect on pain. She was diagnosed with pre-eclampsia and required procardia IR during labor.  Patient progressed to a NSV delivery. No lacerations were noted and repaired.     She did suffer from  post-partum hemorrhage and was treated with cytotec and txa.    Postpartum course was remarkable for acute blood loss anemia. Otherwise, patient has met all postpartum milestones.  Patient had early ambulation, well managed pain, tolerance of diet, spontaneous voiding, and appropriate feeding of infant.   She has remained afebrile and blood pressure has been well controlled.   All maternal questions and concerns addressed.    Single female infant was delivered via  on 5/3/25 at 0657 with APGARs 8 and 9 at 1 and 5 minutes respectively.    ml, treated with cytotec and txa    PHYSICAL EXAM:  Temp:  [36.2 °C (97.2 °F)-36.5 °C (97.7 °F)] 36.2 °C (97.2 °F)  Pulse:  [80-94] 90  Resp:  [18] 18  BP: (125-138)/(83-88) 138/88  SpO2:  [96 %-99 %] 98 %    GEN: well appearing, no apparent distress  CV: +S1S2, RRR, no BLE edema  RESP: CTAB, breathing comfortably on RA  ABD: soft, non-tender, non-distended, +BS  Fundus: firm below level of umbilicus  Incision: not applicable, (vaginal delivery)  Perineum: Deferred  Extremities: symmetric, calves nontender    HISTORY:  Patient Active Problem List   Diagnosis    Current moderate episode of major depressive disorder (HCC)    EVA (generalized anxiety disorder)    Borderline personality disorder (HCC)    Adjustment disorder  with anxious mood    Chlamydia trachomatis infection of genitourinary site    Panic attack    General counseling and advice on female contraception    PTSD (post-traumatic stress disorder)    Indication for care in labor or delivery    Acute blood loss anemia    Pre-eclampsia in third trimester      Past Medical History:   Diagnosis Date    Anxiety     Depression     IUD (intrauterine device) in place 2021     OB History    Para Term  AB Living   3 1 1  0 3   SAB IAB Ectopic Molar Multiple Live Births    0   0 1      # Outcome Date GA Lbr Nolan/2nd Weight Sex Type Anes PTL Lv   3 Term 25 39w6d  3.065 kg (6 lb 12.1 oz) F Vag-Spont None N RIKA   2             1               Past Surgical History:   Procedure Laterality Date    HERNIA REPAIR N/A      No Known Allergies   Current Facility-Administered Medications   Medication Dose    ferrous sulfate tablet 325 mg  325 mg    lactated ringers infusion  2,000 mL    docusate sodium (Colace) capsule 100 mg  100 mg    ibuprofen (Motrin) tablet 800 mg  800 mg    acetaminophen (Tylenol) tablet 1,000 mg  1,000 mg    tetanus-dipth-acell pertussis (Tdap) inj 0.5 mL  0.5 mL    prenatal plus vitamin (Stuartnatal 1+1) 27-1 MG tablet 1 Tablet  1 Tablet    simethicone (Mylicon) chewable tablet 125 mg  125 mg    calcium carbonate (Tums) chewable tab 1,000 mg  1,000 mg    polyethylene glycol/lytes (Miralax) Packet 1 Packet  1 Packet     Recent Labs     25  0516 25  1415 25  0549   WBC 9.7 9.8 11.2*   RBC 2.71* 2.89* 2.59*   HEMOGLOBIN 7.4* 8.0* 7.2*   HEMATOCRIT 23.4* 25.2* 22.6*   MCV 86.3 87.2 87.3   MCH 27.3 27.7 27.8   MCHC 31.6* 31.7* 31.9*   RDW 49.3 50.4* 50.6*   PLATELETCT 208 244 209   MPV 10.4 10.2 10.1       Discharge Meds:   Current Outpatient Medications   Medication Sig Dispense Refill    docusate sodium 100 MG Cap Take 1 capsule by mouth 2 times a day as needed for Constipation. 60 Capsule 0    [START ON 2025]  ferrous sulfate 325 (65 Fe) MG tablet Take 1 Tablet by mouth every morning with breakfast. 30 Tablet 0    ibuprofen (MOTRIN) 800 MG Tab Take 1 Tablet by mouth every 8 hours as needed for Mild Pain or Moderate Pain.      [START ON 5/6/2025] polyethylene glycol/lytes (MIRALAX) 17 g Pack Take 1 Packet by mouth every day.      prenatal plus vitamin (STUARTNATAL 1+1) 27-1 MG Tab tablet Take 1 Tablet by mouth every day.         #Contraception  - opts for depot shot before dc, would like IUD at some point    Activity/ Discharge Instructions:  Discharge to home  Exercise and Activities as tolerated  Call or come to ED for: heavy vaginal bleeding, fever >100.4, severe abdominal pain, severe headache, chest pain, shortness of breath, significant nausea or vomiting, incisional drainage, or other concerns.    Diet:  As tolerated. Additional 400 kcal per day to maintain milk supply. Drink plenty of fluids daily.  Continue prenatal vitamins for six months or as long as breastfeeding.  Continue iron and vitamin C every other day for six months or until anemia improves.     Follow up:     [facility] in five weeks for vaginal delivery.      Darron Torres MD  PGY1  [facility]

## 2025-06-02 ENCOUNTER — TELEPHONE (OUTPATIENT)
Dept: OBGYN | Facility: CLINIC | Age: 30
End: 2025-06-02
Payer: MEDICAID

## 2025-06-02 NOTE — TELEPHONE ENCOUNTER
Caller Name: Tierra Horn  Call Back Number: [phone]    How would the patient prefer to be contacted with a response: Phone call OK to leave a detailed message    Pt called to see if she can to the IUD at her PP visit notified pt that we might be able to just depends how the visit go and if theres any compactions

## 2025-06-10 ASSESSMENT — EDINBURGH POSTNATAL DEPRESSION SCALE (EPDS)
I HAVE BEEN SO UNHAPPY THAT I HAVE HAD DIFFICULTY SLEEPING: NOT AT ALL
I HAVE BEEN SO UNHAPPY THAT I HAVE BEEN CRYING: NO, NEVER
I HAVE FELT SCARED OR PANICKY FOR NO GOOD REASON: NO, NOT AT ALL
THE THOUGHT OF HARMING MYSELF HAS OCCURRED TO ME: NEVER
I HAVE BEEN ANXIOUS OR WORRIED FOR NO GOOD REASON: YES, SOMETIMES
I HAVE FELT SAD OR MISERABLE: NO, NOT AT ALL
I HAVE BEEN ABLE TO LAUGH AND SEE THE FUNNY SIDE OF THINGS: AS MUCH AS I ALWAYS COULD
I HAVE LOOKED FORWARD WITH ENJOYMENT TO THINGS: AS MUCH AS I EVER DID
THINGS HAVE BEEN GETTING ON TOP OF ME: NO, MOST OF THE TIME I HAVE COPED QUITE WELL
I HAVE BLAMED MYSELF UNNECESSARILY WHEN THINGS WENT WRONG: YES, SOME OF THE TIME
TOTAL SCORE: 5

## 2025-06-11 ENCOUNTER — HOSPITAL ENCOUNTER (OUTPATIENT)
Facility: MEDICAL CENTER | Age: 30
End: 2025-06-11
Payer: MEDICAID

## 2025-06-11 ENCOUNTER — POST PARTUM (OUTPATIENT)
Dept: OBGYN | Facility: CLINIC | Age: 30
End: 2025-06-11
Payer: MEDICAID

## 2025-06-11 ENCOUNTER — HOSPITAL ENCOUNTER (OUTPATIENT)
Dept: LAB | Facility: MEDICAL CENTER | Age: 30
End: 2025-06-11
Payer: MEDICAID

## 2025-06-11 VITALS — WEIGHT: 152 LBS | SYSTOLIC BLOOD PRESSURE: 98 MMHG | BODY MASS INDEX: 27.8 KG/M2 | DIASTOLIC BLOOD PRESSURE: 62 MMHG

## 2025-06-11 DIAGNOSIS — Z32.02 URINE PREGNANCY TEST NEGATIVE: ICD-10-CM

## 2025-06-11 DIAGNOSIS — D62 ACUTE BLOOD LOSS ANEMIA: ICD-10-CM

## 2025-06-11 DIAGNOSIS — Z12.4 SCREENING FOR CERVICAL CANCER: Primary | ICD-10-CM

## 2025-06-11 DIAGNOSIS — Z30.430 ENCOUNTER FOR IUD INSERTION: ICD-10-CM

## 2025-06-11 LAB
BASOPHILS # BLD AUTO: 0.7 % (ref 0–1.8)
BASOPHILS # BLD: 0.05 K/UL (ref 0–0.12)
EOSINOPHIL # BLD AUTO: 0.19 K/UL (ref 0–0.51)
EOSINOPHIL NFR BLD: 2.8 % (ref 0–6.9)
ERYTHROCYTE [DISTWIDTH] IN BLOOD BY AUTOMATED COUNT: 47.8 FL (ref 35.9–50)
HCT VFR BLD AUTO: 36.5 % (ref 37–47)
HGB BLD-MCNC: 11.1 G/DL (ref 12–16)
IMM GRANULOCYTES # BLD AUTO: 0.02 K/UL (ref 0–0.11)
IMM GRANULOCYTES NFR BLD AUTO: 0.3 % (ref 0–0.9)
INT CON NEG: NEGATIVE
INT CON POS: POSITIVE
LYMPHOCYTES # BLD AUTO: 2.83 K/UL (ref 1–4.8)
LYMPHOCYTES NFR BLD: 42.1 % (ref 22–41)
MCH RBC QN AUTO: 25 PG (ref 27–33)
MCHC RBC AUTO-ENTMCNC: 30.4 G/DL (ref 32.2–35.5)
MCV RBC AUTO: 82.2 FL (ref 81.4–97.8)
MONOCYTES # BLD AUTO: 0.48 K/UL (ref 0–0.85)
MONOCYTES NFR BLD AUTO: 7.1 % (ref 0–13.4)
NEUTROPHILS # BLD AUTO: 3.15 K/UL (ref 1.82–7.42)
NEUTROPHILS NFR BLD: 47 % (ref 44–72)
NRBC # BLD AUTO: 0 K/UL
NRBC BLD-RTO: 0 /100 WBC (ref 0–0.2)
PLATELET # BLD AUTO: 375 K/UL (ref 164–446)
PMV BLD AUTO: 9.8 FL (ref 9–12.9)
POC URINE PREGNANCY TEST: NEGATIVE
RBC # BLD AUTO: 4.44 M/UL (ref 4.2–5.4)
WBC # BLD AUTO: 6.7 K/UL (ref 4.8–10.8)

## 2025-06-11 PROCEDURE — 87591 N.GONORRHOEAE DNA AMP PROB: CPT

## 2025-06-11 PROCEDURE — 88142 CYTOPATH C/V THIN LAYER: CPT

## 2025-06-11 PROCEDURE — 81025 URINE PREGNANCY TEST: CPT

## 2025-06-11 PROCEDURE — 36415 COLL VENOUS BLD VENIPUNCTURE: CPT

## 2025-06-11 PROCEDURE — 85025 COMPLETE CBC W/AUTO DIFF WBC: CPT

## 2025-06-11 PROCEDURE — 58300 INSERT INTRAUTERINE DEVICE: CPT

## 2025-06-11 PROCEDURE — 3078F DIAST BP <80 MM HG: CPT

## 2025-06-11 PROCEDURE — 87491 CHLMYD TRACH DNA AMP PROBE: CPT

## 2025-06-11 PROCEDURE — 0503F POSTPARTUM CARE VISIT: CPT

## 2025-06-11 PROCEDURE — 3074F SYST BP LT 130 MM HG: CPT

## 2025-06-11 RX ORDER — PSEUDOEPHEDRINE HCL 30 MG
100 TABLET ORAL 2 TIMES DAILY PRN
Qty: 60 CAPSULE | Refills: 0 | Status: SHIPPED | OUTPATIENT
Start: 2025-06-11

## 2025-06-11 RX ORDER — VITAMIN A ACETATE, BETA CAROTENE, ASCORBIC ACID, CHOLECALCIFEROL, .ALPHA.-TOCOPHEROL ACETATE, DL-, THIAMINE MONONITRATE, RIBOFLAVIN, NIACINAMIDE, PYRIDOXINE HYDROCHLORIDE, FOLIC ACID, CYANOCOBALAMIN, CALCIUM CARBONATE, FERROUS FUMARATE, ZINC OXIDE, CUPRIC OXIDE 3080; 12; 120; 400; 1; 1.84; 3; 20; 22; 920; 25; 200; 27; 10; 2 [IU]/1; UG/1; MG/1; [IU]/1; MG/1; MG/1; MG/1; MG/1; MG/1; [IU]/1; MG/1; MG/1; MG/1; MG/1; MG/1
1 TABLET, FILM COATED ORAL DAILY
Qty: 90 TABLET | Refills: 2 | Status: SHIPPED | OUTPATIENT
Start: 2025-06-11

## 2025-06-11 RX ORDER — COPPER 313.4 MG/1
1 INTRAUTERINE DEVICE INTRAUTERINE ONCE
Status: COMPLETED | OUTPATIENT
Start: 2025-06-11 | End: 2025-06-11

## 2025-06-11 RX ADMIN — COPPER 1 EACH: 313.4 INTRAUTERINE DEVICE INTRAUTERINE at 14:26

## 2025-06-11 ASSESSMENT — EDINBURGH POSTNATAL DEPRESSION SCALE (EPDS)
TOTAL SCORE: 4
I HAVE BEEN ANXIOUS OR WORRIED FOR NO GOOD REASON: HARDLY EVER
I HAVE BEEN SO UNHAPPY THAT I HAVE HAD DIFFICULTY SLEEPING: NOT VERY OFTEN
I HAVE FELT SAD OR MISERABLE: NO, NOT AT ALL
I HAVE BEEN ABLE TO LAUGH AND SEE THE FUNNY SIDE OF THINGS: AS MUCH AS I ALWAYS COULD
I HAVE BLAMED MYSELF UNNECESSARILY WHEN THINGS WENT WRONG: YES, SOME OF THE TIME
I HAVE BEEN SO UNHAPPY THAT I HAVE BEEN CRYING: NO, NEVER
THINGS HAVE BEEN GETTING ON TOP OF ME: NO, I HAVE BEEN COPING AS WELL AS EVER
I HAVE FELT SCARED OR PANICKY FOR NO GOOD REASON: NO, NOT AT ALL
I HAVE LOOKED FORWARD WITH ENJOYMENT TO THINGS: AS MUCH AS I EVER DID
THE THOUGHT OF HARMING MYSELF HAS OCCURRED TO ME: NEVER

## 2025-06-11 ASSESSMENT — FIBROSIS 4 INDEX: FIB4 SCORE: 1.13

## 2025-06-11 NOTE — PROCEDURES
IUD INSERTION PROCEDURE NOTE    Tierra Horn  is here for Paragard IUD insertion.     Today the patient is counseled on procedure of IUD insertion. I discussed with the patient the risks of IUD including infection, risk of IUD expulsion, the risk of uterine perforation (1-1000, slightly higher while Bfing), risk of IUD migration or lost strings.  If the IUD does migrate the patient may require a separate procedure such as hysteroscopy or laparoscopy to remove or retrieve the migrated IUD. I also discussed the 0.1% risk of pregnancy with IUD use which coincides with an increased risk of ectopic pregnancy with IUD use.  We reviewed leaving the strings long enough to prevent disappearance however this may initially result in the possibility that partner can feel the IUD during intercourse; this typically resolves as the strings soften and tuck behind cervix. I also discussed the side effects of progestin-containing IUDs including decreased, irregular or absent menses and/or spotting.  All questions answered.  Patient provided verbal consent after risks and benefits of Paragard IUD discussed. UPT negative.     BP 98/62   Wt 152 lb   BMI 27.80 kg/m²     Procedure  The pelvis was examined and the uterus is anteverted.  The speculum was placed.  Beta-dine was applied to the cervix.  Tenaculum clamp was used used to grasp the cervix and provide counter traction.  The uterus was sounded with the IUD device to 7.5cm. The device was withdrawn 1cm and the IUD was then deployed and gently advanced to the fundus without complications then the device removed.  Type of IUD placed: Paragard       Aftercare discussed. She is to return for fever, worsening pelvic pain, abdominal pain, syncope, unusually heavy vaginal bleeding, suspected expulsion, foul smelling vaginal discharge, or pregnancy-like symptoms.     If the patient is comfortable she may also perform a digital self examination to check for the  strings.  Return to the office in 4-6 weeks for string check or any concerns with IUD symptoms/placement/possible expulsion.      ELPIDIO Gallagher.

## 2025-06-11 NOTE — PROGRESS NOTES
Pt here for PostPartumCheck  Breast or Bottle:Bottle  Delivery Type:Vag  Desired BCM:IUD  LMP:N/A  Last PAP:2/22/2022 Nilm  Phone/Pharmacy Verified:  Pt states:  EPDS:4

## 2025-06-11 NOTE — PROGRESS NOTES
POST PARTUM VISIT NOTE    SUBJECTIVE:  Tierra Horn is a 29 y.o.  who presents for postpartum exam.   I have reviewed the prenatal and intrapartum course. Pt would like IUD placed today. Patient was sexually active three days ago, but her and partner did use a condom.     Patient is doing well. Infant is gaining weight on their growth curve and seeing pediatrician as directed.     Date of delivery :  2025   Type of delivery: Vaginal Delivery. Had 700 cc EBL and pre-e  - Most recent CBC demonstrated Hb of 7.2     Last pap date: 2022 NILM     Delivery laceration(s): none  Feeding: Bottle Feeding with formula   Postpartum course: uncomplicated, no urinary leakage  Contraceptive plans: IUD - pt would like Copper IUD. Patient has had previous and would like this again. Reports periods were tolerable without significant bleeding.    ROS:  She feels well healed.   Laceration and/or Incision is well healed.   Bleeding lasted about 4 weeks.  She is getting along well with her partner.   She reports her mood is good. Denies postpartum blues.   Denies urinary or stool incontinence.    I have reviewed the patient's PMH for contraceptive risk factors, and she has no contraindications to contraception as planned.     OBJECTIVE:    There were no vitals taken for this visit.    Appears well, vital signs normal.  Breast Exam: exam deferred.  Abdomen: soft, nontender  Pelvic Exam:  Perineum: perineum intact and well healed  Vulva: No external lesions, normal hair distribution, no adenopathy  Urethra: appears normal with no lesions    EPDS score: 4    ASSESSMENT:  normal postpartum course    PLAN:  BP WNL today.   Ordered CBC to assess anemia status   Contraception planned:  Copper IUD. Discussed this with Dr. Campbell d/t patient's hx of postpartum hemorrhage and most recent Hb result on 2025 being 7.2. It was stated that if patient is not bleeding has stopped that Paragard may be inserted today.  This was done. Patient was advised to go to ED if she consistently soaks through pad/tampon every hour or severe cramping. See procedure note. Patient tolerated procedure well.   Discussed appropriate pregnancy interval of 12-18m.   Pt cleared for activity and off pelvic rest.       ELPIDIO Gallagher.

## 2025-06-12 ENCOUNTER — RESULTS FOLLOW-UP (OUTPATIENT)
Dept: OBGYN | Facility: CLINIC | Age: 30
End: 2025-06-12
Payer: MEDICAID

## 2025-06-12 ENCOUNTER — TELEPHONE (OUTPATIENT)
Dept: OBGYN | Facility: CLINIC | Age: 30
End: 2025-06-12
Payer: MEDICAID

## 2025-06-12 DIAGNOSIS — D50.9 IRON DEFICIENCY ANEMIA, UNSPECIFIED IRON DEFICIENCY ANEMIA TYPE: Primary | ICD-10-CM

## 2025-06-12 LAB
C TRACH DNA GENITAL QL NAA+PROBE: NEGATIVE
N GONORRHOEA DNA GENITAL QL NAA+PROBE: NEGATIVE
SPECIMEN SOURCE: NORMAL

## 2025-06-12 NOTE — TELEPHONE ENCOUNTER
Caller Name: Tierra Horn    Call Back Number: [phone]       I called the pt to let her know that I did not get her signature for the consent form for IUD Insertion placed 6/11/2025. I asked pt if she was able to come back but pt stated she will try her best to come back and sign the consent form and let her know office hours is 8am-5pm and will be here till 5pm. Phone call ended. I called pt again around 4:30 pm asking pt if she is able to come in pt stated she will not be able to since her and her partner share a car and partner is currently using their car. Pt then stated if possible to fax it to her or on [employer]. I let pt know I will reach back to her to see if that is something we can do.

## 2025-06-12 NOTE — PROGRESS NOTES
It was brought to my attention that patient did not sign informed consent to procedure yesterday for  IUD insertion. Patient did provide verbal consent prior to the procedure after risks were discussed. Patient was called and she is willing to sign informed consent by either coming into the clinic (although this is difficult for her as her  works and she has many children at home) or if we can send it to her tim [place] she would prefer to sign it that way. My medical assistant, Felipa Mcclelland MA discussed with senior medical assistant, Anabel Argueta, and it was recommended for patient to sign this on day of string check.I also discussed this with Vicky Flores, area practice manager and due to patient clearly providing verbal consent after risks and benefits were discussed, patient will be able to provide the informed consent signature as soon as she comes for her string check on 07/14.      ELPIDIO Gallagher.

## 2025-06-12 NOTE — TELEPHONE ENCOUNTER
Caller Name: Tierra Horn    Call Back Number: [phone]     I called the pt to let her know we will have her sign the IUD insertion Consent form at her IUD check appointment since it is a struggle to get to the clinic since her and her partner only have one car to use. I let her know know I totally understand and my apologies . Pt was understanding and aware of plan.

## 2025-06-21 LAB — THINPREP PAP, CYTOLOGY NL11781: NORMAL

## 2025-06-23 ENCOUNTER — RESULTS FOLLOW-UP (OUTPATIENT)
Dept: OBGYN | Facility: CLINIC | Age: 30
End: 2025-06-23
Payer: MEDICAID

## 2025-08-11 ENCOUNTER — NON-PROVIDER VISIT (OUTPATIENT)
Dept: URGENT CARE | Facility: PHYSICIAN GROUP | Age: 30
End: 2025-08-11

## 2025-08-11 DIAGNOSIS — Z11.1 PPD SCREENING TEST: Primary | ICD-10-CM

## 2025-08-11 PROCEDURE — 86580 TB INTRADERMAL TEST: CPT

## 2025-08-13 ENCOUNTER — NON-PROVIDER VISIT (OUTPATIENT)
Dept: URGENT CARE | Facility: PHYSICIAN GROUP | Age: 30
End: 2025-08-13

## 2025-08-13 LAB — TB WHEAL 3D P 5 TU DIAM: NORMAL MM
